# Patient Record
Sex: FEMALE | Race: WHITE | Employment: FULL TIME | URBAN - METROPOLITAN AREA
[De-identification: names, ages, dates, MRNs, and addresses within clinical notes are randomized per-mention and may not be internally consistent; named-entity substitution may affect disease eponyms.]

---

## 2018-01-20 ENCOUNTER — HOSPITAL ENCOUNTER (EMERGENCY)
Age: 58
Discharge: HOME | End: 2018-01-20
Attending: EMERGENCY MEDICINE
Payer: COMMERCIAL

## 2018-01-20 ENCOUNTER — APPOINTMENT (OUTPATIENT)
Dept: GENERAL RADIOLOGY | Age: 58
End: 2018-01-20
Payer: COMMERCIAL

## 2018-01-20 VITALS
OXYGEN SATURATION: 98 % | SYSTOLIC BLOOD PRESSURE: 120 MMHG | HEART RATE: 64 BPM | DIASTOLIC BLOOD PRESSURE: 74 MMHG | TEMPERATURE: 97.9 F

## 2018-01-20 DIAGNOSIS — M79.672 LEFT FOOT PAIN: ICD-10-CM

## 2018-01-20 DIAGNOSIS — M25.572 ACUTE LEFT ANKLE PAIN: Primary | ICD-10-CM

## 2018-01-20 DIAGNOSIS — M25.561 ACUTE PAIN OF RIGHT KNEE: ICD-10-CM

## 2018-01-20 PROCEDURE — 73590 X-RAY EXAM OF LOWER LEG: CPT

## 2018-01-20 PROCEDURE — 6370000000 HC RX 637 (ALT 250 FOR IP): Performed by: NURSE PRACTITIONER

## 2018-01-20 PROCEDURE — 73610 X-RAY EXAM OF ANKLE: CPT

## 2018-01-20 PROCEDURE — 99283 EMERGENCY DEPT VISIT LOW MDM: CPT

## 2018-01-20 PROCEDURE — 73562 X-RAY EXAM OF KNEE 3: CPT

## 2018-01-20 PROCEDURE — 73630 X-RAY EXAM OF FOOT: CPT

## 2018-01-20 RX ORDER — IBUPROFEN 800 MG/1
800 TABLET ORAL ONCE
Status: COMPLETED | OUTPATIENT
Start: 2018-01-20 | End: 2018-01-20

## 2018-01-20 RX ADMIN — IBUPROFEN 800 MG: 800 TABLET, FILM COATED ORAL at 15:19

## 2018-01-20 ASSESSMENT — PAIN SCALES - GENERAL
PAINLEVEL_OUTOF10: 6
PAINLEVEL_OUTOF10: 5

## 2018-01-20 NOTE — ED NOTES
Here today after a fall 2 weeks ago. States her left foot really became worse 4 days ago. \"I'm on my feet a lot, and the pain just got really bad 4 days ago. I just started a new job recently\". Denies paresthesia.      Lillie Trujillo RN  01/20/18 [id number]

## 2018-01-20 NOTE — ED PROVIDER NOTES
[address] [place] [address] ED  Emergency Department Encounter  Mid Level Provider     Pt Name: Mike Eagle  MRN: [id number]  [place] 1960  Date of evaluation: 1/20/18  PCP:  No primary care provider on file. CHIEF COMPLAINT       Chief Complaint   Patient presents with    Ankle Pain     lt ankle pain past week       HISTORY OF PRESENT ILLNESS  (Location/Symptom, Timing/Onset, Context/Setting, Quality, Duration, Modifying Factors, Severity.)      Mike Eagle is a 56 y.o. female who presents with Trip and fall over a boot while entering her home 2 weeks ago. Patient reports she injured her right knee and right lower leg. Patient also reports she injured her left ankle/left foot. Patient reports pain has been tolerable however 4 days ago the pain to the left ankle became severe. Patient reports she had a difficult time sleeping last night. 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 Patient is not accustomed to this. Patient does have a primary care provider on Tuesday but states that she can no longer tolerate the discomfort so came to the emergency room for evaluation. Patient was ambulatory to the room prior to my arrival.     PAST MEDICAL / SURGICAL / SOCIAL / FAMILY HISTORY      has a past medical history of Complete situs inversus with dextrocardia. has a past surgical history that includes knee surgery; Nasal septum surgery; Inner ear surgery; and Foot surgery. Social History     Social History    Marital status: Single     Spouse name: N/A    Number of children: N/A    Years of education: N/A     Occupational History    Not on file.      Social History Main Topics    Smoking status: Never Smoker    Smokeless tobacco: Not on file    Alcohol use Yes      Comment: social    Drug use: No    Sexual activity: Not on file     Other Topics Concern    Not on file     Social History Narrative    No narrative / Ritu Began / EKG):  Orders Placed This Encounter   Procedures    XR FOOT LEFT (MIN 3 VIEWS)    XR ANKLE LEFT (MIN 3 VIEWS)    XR KNEE RIGHT (3 VIEWS)    XR TIBIA FIBULA RIGHT (2 VIEWS)    Apply ace wrap       MEDICATIONS ORDERED:  Orders Placed This Encounter   Medications    ibuprofen (ADVIL;MOTRIN) tablet 800 mg       Controlled Substances Monitoring:      DIAGNOSTIC RESULTS / EMERGENCY DEPARTMENT COURSE / MDM     RADIOLOGY:   I directly visualized (with the attending physician) the following  images and reviewed the radiologist interpretations:  No results found. XR ANKLE LEFT (MIN 3 VIEWS)   Final Result   No acute bony or joint abnormality         XR FOOT LEFT (MIN 3 VIEWS)   Final Result   1. No acute bony or joint abnormality   2. Osteoarthritic changes throughout the foot         XR TIBIA FIBULA RIGHT (2 VIEWS)   Final Result   No acute osseous abnormality. XR KNEE RIGHT (3 VIEWS)   Final Result   1. No acute bony or joint abnormality   2. Tricompartmental osteoarthritic changes             LABS:  No results found for this visit on 01/20/18. EMERGENCY DEPARTMENT COURSE:  Patient advised of x-ray results. Advised her she can wear the Ace wrap for comfort. Work note provided as requested. Patient advised to maintain appointment with PCP on Tuesday as directed. Patient can return to emergency room for any worsening symptoms or concerns    CONSULTS:  None    PROCEDURES:  None    FINAL IMPRESSION      1. Acute left ankle pain    2. Acute pain of right knee    3. Left foot pain          DISPOSITION / PLAN     DISPOSITION Decision To Discharge    PATIENT REFERRED TO:  No follow-up provider specified. DISCHARGE MEDICATIONS:  There are no discharge medications for this patient.       Mehul Perrin CNP   Emergency Medicine Nurse Practitioner    (Please note that portions of this note were completed with a voice recognition program.  Efforts were made to edit the dictations but occasionally

## 2019-10-15 ENCOUNTER — TELEPHONE (OUTPATIENT)
Dept: BARIATRICS/WEIGHT MGMT | Age: 59
End: 2019-10-15

## 2019-10-30 ENCOUNTER — TELEPHONE (OUTPATIENT)
Dept: BARIATRICS/WEIGHT MGMT | Age: 59
End: 2019-10-30

## 2019-11-19 ENCOUNTER — OFFICE VISIT (OUTPATIENT)
Dept: BARIATRICS/WEIGHT MGMT | Age: 59
End: 2019-11-19
Payer: COMMERCIAL

## 2019-11-19 VITALS
DIASTOLIC BLOOD PRESSURE: 68 MMHG | WEIGHT: 293 LBS | BODY MASS INDEX: 47.09 KG/M2 | HEART RATE: 70 BPM | RESPIRATION RATE: 20 BRPM | SYSTOLIC BLOOD PRESSURE: 114 MMHG | HEIGHT: 66 IN

## 2019-11-19 DIAGNOSIS — Q89.3 SITUS INVERSUS ABDOMINALIS: Primary | ICD-10-CM

## 2019-11-19 PROCEDURE — 99204 OFFICE O/P NEW MOD 45 MIN: CPT | Performed by: SURGERY

## 2019-11-21 ENCOUNTER — TELEPHONE (OUTPATIENT)
Dept: BARIATRICS/WEIGHT MGMT | Age: 59
End: 2019-11-21

## 2019-11-21 ENCOUNTER — NURSE ONLY (OUTPATIENT)
Dept: BARIATRICS/WEIGHT MGMT | Age: 59
End: 2019-11-21

## 2019-11-21 VITALS — BODY MASS INDEX: 49.82 KG/M2 | WEIGHT: 293 LBS

## 2019-12-03 ENCOUNTER — HOSPITAL ENCOUNTER (OUTPATIENT)
Dept: CT IMAGING | Age: 59
Discharge: HOME | End: 2019-12-05
Payer: COMMERCIAL

## 2019-12-03 ENCOUNTER — HOSPITAL ENCOUNTER (OUTPATIENT)
Age: 59
Discharge: HOME | End: 2019-12-03
Payer: COMMERCIAL

## 2019-12-03 DIAGNOSIS — Q89.3 SITUS INVERSUS ABDOMINALIS: ICD-10-CM

## 2019-12-03 LAB
ALBUMIN SERPL-MCNC: 4.3 G/DL (ref 3.5–5.2)
ALBUMIN/GLOBULIN RATIO: ABNORMAL (ref 1–2.5)
ALP BLD-CCNC: 67 U/L (ref 35–104)
ALT SERPL-CCNC: 7 U/L (ref 5–33)
ANION GAP SERPL CALCULATED.3IONS-SCNC: 13 MMOL/L (ref 9–17)
AST SERPL-CCNC: 15 U/L
BILIRUB SERPL-MCNC: 0.3 MG/DL (ref 0.3–1.2)
BUN BLDV-MCNC: 14 MG/DL (ref 6–20)
BUN/CREAT BLD: 31 (ref 9–20)
CALCIUM SERPL-MCNC: 9.1 MG/DL (ref 8.6–10.4)
CHLORIDE BLD-SCNC: 101 MMOL/L (ref 98–107)
CO2: 24 MMOL/L (ref 20–31)
CREAT SERPL-MCNC: 0.45 MG/DL (ref 0.5–0.9)
FOLATE: 10.1 NG/ML
GFR AFRICAN AMERICAN: >60 ML/MIN
GFR NON-AFRICAN AMERICAN: >60 ML/MIN
GFR SERPL CREATININE-BSD FRML MDRD: ABNORMAL ML/MIN/{1.73_M2}
GFR SERPL CREATININE-BSD FRML MDRD: ABNORMAL ML/MIN/{1.73_M2}
GLUCOSE BLD-MCNC: 93 MG/DL (ref 70–99)
HCT VFR BLD CALC: 45.7 % (ref 36.3–47.1)
HEMOGLOBIN: 14.2 G/DL (ref 11.9–15.1)
IRON SATURATION: 22 % (ref 20–55)
IRON: 71 UG/DL (ref 37–145)
MAGNESIUM: 2 MG/DL (ref 1.6–2.6)
MCH RBC QN AUTO: 29.1 PG (ref 25.2–33.5)
MCHC RBC AUTO-ENTMCNC: 31.1 G/DL (ref 28.4–34.8)
MCV RBC AUTO: 93.6 FL (ref 82.6–102.9)
NRBC AUTOMATED: 0 PER 100 WBC
PDW BLD-RTO: 13 % (ref 11.8–14.4)
PLATELET # BLD: 306 K/UL (ref 138–453)
PMV BLD AUTO: 11.2 FL (ref 8.1–13.5)
POTASSIUM SERPL-SCNC: 4.3 MMOL/L (ref 3.7–5.3)
PTH INTACT: 101.8 PG/ML (ref 15–65)
RBC # BLD: 4.88 M/UL (ref 3.95–5.11)
SODIUM BLD-SCNC: 138 MMOL/L (ref 135–144)
T4 TOTAL: 9.2 UG/DL (ref 4.5–12)
TOTAL IRON BINDING CAPACITY: 320 UG/DL (ref 250–450)
TOTAL PROTEIN: 8 G/DL (ref 6.4–8.3)
TSH SERPL DL<=0.05 MIU/L-ACNC: 2.52 MIU/L (ref 0.3–5)
UNSATURATED IRON BINDING CAPACITY: 249 UG/DL (ref 112–347)
VITAMIN B-12: 330 PG/ML (ref 232–1245)
VITAMIN D 25-HYDROXY: 22 NG/ML (ref 30–100)
WBC # BLD: 10.6 K/UL (ref 3.5–11.3)

## 2019-12-03 PROCEDURE — 83735 ASSAY OF MAGNESIUM: CPT

## 2019-12-03 PROCEDURE — 82746 ASSAY OF FOLIC ACID SERUM: CPT

## 2019-12-03 PROCEDURE — 83970 ASSAY OF PARATHORMONE: CPT

## 2019-12-03 PROCEDURE — 36415 COLL VENOUS BLD VENIPUNCTURE: CPT

## 2019-12-03 PROCEDURE — 6360000004 HC RX CONTRAST MEDICATION: Performed by: SURGERY

## 2019-12-03 PROCEDURE — 74177 CT ABD & PELVIS W/CONTRAST: CPT

## 2019-12-03 PROCEDURE — 83540 ASSAY OF IRON: CPT

## 2019-12-03 PROCEDURE — 84590 ASSAY OF VITAMIN A: CPT

## 2019-12-03 PROCEDURE — 83550 IRON BINDING TEST: CPT

## 2019-12-03 PROCEDURE — 84630 ASSAY OF ZINC: CPT

## 2019-12-03 PROCEDURE — 82306 VITAMIN D 25 HYDROXY: CPT

## 2019-12-03 PROCEDURE — 82607 VITAMIN B-12: CPT

## 2019-12-03 PROCEDURE — 2580000003 HC RX 258: Performed by: SURGERY

## 2019-12-03 PROCEDURE — 84436 ASSAY OF TOTAL THYROXINE: CPT

## 2019-12-03 PROCEDURE — 83036 HEMOGLOBIN GLYCOSYLATED A1C: CPT

## 2019-12-03 PROCEDURE — 80053 COMPREHEN METABOLIC PANEL: CPT

## 2019-12-03 PROCEDURE — 84425 ASSAY OF VITAMIN B-1: CPT

## 2019-12-03 PROCEDURE — 85027 COMPLETE CBC AUTOMATED: CPT

## 2019-12-03 PROCEDURE — 84443 ASSAY THYROID STIM HORMONE: CPT

## 2019-12-03 RX ORDER — SODIUM CHLORIDE 0.9 % (FLUSH) 0.9 %
10 SYRINGE (ML) INJECTION PRN
Status: DISCONTINUED | OUTPATIENT
Start: 2019-12-03 | End: 2019-12-06 | Stop reason: HOSPADM

## 2019-12-03 RX ORDER — 0.9 % SODIUM CHLORIDE 0.9 %
80 INTRAVENOUS SOLUTION INTRAVENOUS ONCE
Status: COMPLETED | OUTPATIENT
Start: 2019-12-03 | End: 2019-12-03

## 2019-12-03 RX ADMIN — IOPAMIDOL 80 ML: 755 INJECTION, SOLUTION INTRAVENOUS at 15:08

## 2019-12-03 RX ADMIN — SODIUM CHLORIDE 80 ML: 9 INJECTION, SOLUTION INTRAVENOUS at 15:08

## 2019-12-03 RX ADMIN — Medication 10 ML: at 15:08

## 2019-12-03 RX ADMIN — IOHEXOL 30 ML: 300 INJECTION, SOLUTION INTRAVENOUS at 15:08

## 2019-12-03 NOTE — TELEPHONE ENCOUNTER
CT scan completed today 12-3-19, Dr. Eliseo Croft can you review the results and let me know if patient is going to be an appropriate candidate for surgery, she stated at her initial visit with you she was told once the CT results were reviewed it would be determined if she could proceed with bariatric surgery

## 2019-12-04 ENCOUNTER — OFFICE VISIT (OUTPATIENT)
Dept: BARIATRICS/WEIGHT MGMT | Age: 59
End: 2019-12-04
Payer: COMMERCIAL

## 2019-12-04 VITALS
BODY MASS INDEX: 47.09 KG/M2 | WEIGHT: 293 LBS | HEART RATE: 74 BPM | DIASTOLIC BLOOD PRESSURE: 74 MMHG | RESPIRATION RATE: 18 BRPM | SYSTOLIC BLOOD PRESSURE: 128 MMHG | HEIGHT: 66 IN

## 2019-12-04 DIAGNOSIS — Q89.3 SITUS INVERSUS TOTALIS: ICD-10-CM

## 2019-12-04 DIAGNOSIS — E66.01 OBESITY, CLASS III, BMI 40-49.9 (MORBID OBESITY) (HCC): ICD-10-CM

## 2019-12-04 DIAGNOSIS — Z86.39 HISTORY OF ELEVATED LIPIDS: ICD-10-CM

## 2019-12-04 DIAGNOSIS — Q89.3 KARTAGENER'S SYNDROME: ICD-10-CM

## 2019-12-04 DIAGNOSIS — R73.03 PREDIABETES: ICD-10-CM

## 2019-12-04 DIAGNOSIS — M19.90 ARTHRITIS: Primary | ICD-10-CM

## 2019-12-04 DIAGNOSIS — E55.9 VITAMIN D DEFICIENCY: ICD-10-CM

## 2019-12-04 PROBLEM — E78.5 HLD (HYPERLIPIDEMIA): Status: ACTIVE | Noted: 2019-12-04

## 2019-12-04 PROBLEM — E66.813 OBESITY, CLASS III, BMI 40-49.9 (MORBID OBESITY) (HCC): Status: ACTIVE | Noted: 2019-12-04

## 2019-12-04 LAB
ESTIMATED AVERAGE GLUCOSE: 126 MG/DL
HBA1C MFR BLD: 6 % (ref 4–6)

## 2019-12-04 PROCEDURE — 99213 OFFICE O/P EST LOW 20 MIN: CPT | Performed by: NURSE PRACTITIONER

## 2019-12-04 RX ORDER — ERGOCALCIFEROL 1.25 MG/1
50000 CAPSULE ORAL WEEKLY
Qty: 8 CAPSULE | Refills: 0 | Status: SHIPPED | OUTPATIENT
Start: 2019-12-04 | End: 2020-02-14 | Stop reason: ALTCHOICE

## 2019-12-05 LAB — ZINC: 77.4 UG/DL (ref 60–120)

## 2019-12-06 LAB
RETINYL PALMITATE: 0.03 MG/L (ref 0–0.1)
VITAMIN A LEVEL: 0.54 MG/L (ref 0.3–1.2)
VITAMIN A, INTERP: NORMAL
VITAMIN B1 WHOLE BLOOD: 123 NMOL/L (ref 70–180)

## 2020-01-03 ENCOUNTER — TELEPHONE (OUTPATIENT)
Dept: BARIATRICS/WEIGHT MGMT | Age: 60
End: 2020-01-03

## 2020-01-06 ENCOUNTER — HOSPITAL ENCOUNTER (OUTPATIENT)
Age: 60
Discharge: HOME | End: 2020-01-06
Payer: COMMERCIAL

## 2020-01-06 LAB
CHOLESTEROL/HDL RATIO: 3
CHOLESTEROL: 211 MG/DL
HDLC SERPL-MCNC: 71 MG/DL
LDL CHOLESTEROL: 115 MG/DL (ref 0–130)
TRIGL SERPL-MCNC: 127 MG/DL
VLDLC SERPL CALC-MCNC: ABNORMAL MG/DL (ref 1–30)

## 2020-01-06 PROCEDURE — 80061 LIPID PANEL: CPT

## 2020-01-15 ENCOUNTER — OFFICE VISIT (OUTPATIENT)
Dept: BARIATRICS/WEIGHT MGMT | Age: 60
End: 2020-01-15
Payer: COMMERCIAL

## 2020-01-15 VITALS
SYSTOLIC BLOOD PRESSURE: 108 MMHG | HEART RATE: 60 BPM | HEIGHT: 66 IN | DIASTOLIC BLOOD PRESSURE: 70 MMHG | BODY MASS INDEX: 47.09 KG/M2 | WEIGHT: 293 LBS

## 2020-01-15 PROCEDURE — 99213 OFFICE O/P EST LOW 20 MIN: CPT | Performed by: NURSE PRACTITIONER

## 2020-01-15 RX ORDER — TRAZODONE HYDROCHLORIDE 50 MG/1
50 TABLET ORAL
Status: ON HOLD | COMMUNITY
Start: 2020-01-15 | End: 2020-06-17 | Stop reason: ALTCHOICE

## 2020-01-15 NOTE — PROGRESS NOTES
Medical Weight Management Progress Note    Subjective     Patient being seen for medically supervised weight loss for the chronic conditions of Prediabetes, Kartagener's Syndrome, Situs Inversus Totalis, Arthritis, Hx Elevated Lipids. She is working on the behavior changes discussed at the initial appointment. Patient continues on diet plan. Physical activity includes walking. Weight loss since last visit is 4 lbs. Psych eval scheduled 2020. Needs to schedule EGD. Cardiac Clearance appt scheduled. No current issues. Working toward bariatric surgery:    [x] Sleeve Gastrectomy                                                           [] Kuldeep-en-Y Gastric Bypass    Allergies:  No Known Allergies    Past Medical History:     Past Medical History:   Diagnosis Date    Complete situs inversus with dextrocardia    . Past Surgical History:  Past Surgical History:   Procedure Laterality Date    FOOT SURGERY      rt foot (hardware remains)    INNER EAR SURGERY      rt / reconstruction    KNEE SURGERY      bilateral    NASAL SEPTUM SURGERY         Family History:  History reviewed. No pertinent family history.     Social History:  Social History     Socioeconomic History    Marital status: Single     Spouse name: Not on file    Number of children: Not on file    Years of education: Not on file    Highest education level: Not on file   Occupational History    Not on file   Social Needs    Financial resource strain: Not on file    Food insecurity:     Worry: Not on file     Inability: Not on file    Transportation needs:     Medical: Not on file     Non-medical: Not on file   Tobacco Use    Smoking status: Former Smoker     Last attempt to quit: 1993     Years since quittin.0    Smokeless tobacco: Never Used   Substance and Sexual Activity    Alcohol use: Yes     Comment: social    Drug use: No    Sexual activity: Not on file   Lifestyle    Physical activity:     Days per week:

## 2020-01-15 NOTE — PROGRESS NOTES
Medical Nutrition Therapy   Metabolic and Bariatric Surgery         Supervised diet and exercise preparation  Visit 2 out of 6  Pt reports:     Pt currently following structured meal plan see goal 23 for weight management. Reviewed with pt. Vitals: Wt Readings from Last 3 Encounters:   01/15/20 294 lb (133.4 kg)   12/04/19 298 lb (135.2 kg)   11/21/19 (!) 304 lb (137.9 kg)     lost 4 lbs over 1 month. Nutrition Assessment:   PES: Knowledge deficit related to healthy behaviors that support weight management post weight loss surgery as evidenced by Body mass index is 48.18 kg/m². Nutrition Assessment of Goal Attainment:  TREATMENT GOALS:    1. Pt  Completed 4 out of 4 goals. 2.TREATMENT GOALS FOR UPCOMING WEEK: continue all previous goals and add: # 15    All goals were planned with and agreed on by the patient. Goal Card  Name                                                                                                                           Stephanie Ibanez  1. I will read the entire patient educational binder provided to me prior to my second appointment at [facility]. 2. I will make my psychological evaluation appointment prior to my second appointment at [facility]. 3. I will bring this tracking tool to every appointment with a health care provider at [facility]. 4. I will eliminate all nicotine, tobacco and e-cigarettes prior to surgery. 5. I will limit alcoholic beverages prior to surgery to 1 mixed drink or glass of wine (4-6oz). 6. I will limit dining out including fast food to 3 times a week prior to surgery. 7. I will eliminate sugary beverages prior to surgery. 8. I will eliminate carbonated beverages prior to surgery. 9. I will eliminate drinking with a straw prior to surgery. 10. I will limit caffeinated beverages prior to my surgery to [address] daily. 11. I will eliminate cold cereals prepared with milk prior to surgery. 12. I will do a 5 minute reflection    13.  I will food journal daily (If I dont find this helpful after one month I may discontinue the behavior with the understanding that it will be important to my health to do this for the first three months following surgery). 14. I will exercise daily for 10-30  minutes daily 24 days per month or more as tolerated. I will keep a daily log of my physical activity each day. 15. I will determine my optimal supplement plan. 16. I will purchase my supplements. 17. I will begin taking supplements according to my plan. 18. I will eat 8-11 servings of lean protein daily following the guidelines for meal planning in the patient educational binder provided to me. 19. I will eat every 3-5 hours for all meals for one day each week on a day of my choosing. 20. I will maintain my fluid intake of at least 64oz daily. 21. I will follow the 15/30/15 rule at least one day each week for all meals I am allowed to have a small 4oz beverage as needed at meal times. ( 15-30-15-do not drink 15minutes prior to a meal, take 30minutes to eat your meal and dont drink 15 minutes after your meal)    22. I will eat around my plate at all meals at least one day each week on a day of my choosing. Please write down the greatest motivator that brought you to us today  I want to manage my weight because I want to be pain free                                appt # na oa What is your next step?   G# 1 2 3 4 5 6 7 8 9   0  x  1 100           0  x  2 100           2    3 100 100           x   4             x   5             x   6             x   7             x   8             x   9             x   10                11                12                13            2    14 100 100          2    15                16                17                18                19            1  x  20  100              21 this time patient is not moving forward  in developing the skills needed for success after surgery. Plan:    Continue to follow monthly and review goals.          [x]  Nutrition visits complete    []

## 2020-01-22 ENCOUNTER — NURSE ONLY (OUTPATIENT)
Dept: BARIATRICS/WEIGHT MGMT | Age: 60
End: 2020-01-22

## 2020-02-14 ENCOUNTER — OFFICE VISIT (OUTPATIENT)
Dept: BARIATRICS/WEIGHT MGMT | Age: 60
End: 2020-02-14
Payer: COMMERCIAL

## 2020-02-14 VITALS
BODY MASS INDEX: 46.77 KG/M2 | RESPIRATION RATE: 18 BRPM | HEIGHT: 66 IN | DIASTOLIC BLOOD PRESSURE: 82 MMHG | WEIGHT: 291 LBS | HEART RATE: 74 BPM | SYSTOLIC BLOOD PRESSURE: 122 MMHG

## 2020-02-14 PROCEDURE — 99213 OFFICE O/P EST LOW 20 MIN: CPT | Performed by: NURSE PRACTITIONER

## 2020-02-14 NOTE — PROGRESS NOTES
Medical Nutrition Therapy   Metabolic and Bariatric Surgery         Supervised diet and exercise preparation  Visit 3 out of 6  Pt reports:      Pt currently following structured meal plan 8pro/3veg/2fr/6 starch/3fat from education binder diet for weight management. Reviewed with pt. Vitals: Wt Readings from Last 3 Encounters:   02/14/20 291 lb (132 kg)   01/15/20 294 lb (133.4 kg)   12/04/19 298 lb (135.2 kg)     lost 3 lbs over 1 month. Nutrition Assessment:   PES: Knowledge deficit related to healthy behaviors that support weight management post weight loss surgery as evidenced by Body mass index is 47.67 kg/m². Nutrition Assessment of Goal Attainment:  TREATMENT GOALS:    1. Pt  Completed 4 out of 4 goals. 2.TREATMENT GOALS FOR UPCOMING WEEK: continue all previous goals and add: # 0    All goals were planned with and agreed on by the patient. Goal Card  Name                                                                                                                           Joaquin Bello  1. I will read the entire patient educational binder provided to me prior to my second appointment at [facility]. 2. I will make my psychological evaluation appointment prior to my second appointment at [facility]. 3. I will bring this tracking tool to every appointment with a health care provider at [facility]. 4. I will eliminate all nicotine, tobacco and e-cigarettes prior to surgery. 5. I will limit alcoholic beverages prior to surgery to 1 mixed drink or glass of wine (4-6oz). 6. I will limit dining out including fast food to 3 times a week prior to surgery. 7. I will eliminate sugary beverages prior to surgery. 8. I will eliminate carbonated beverages prior to surgery. 9. I will eliminate drinking with a straw prior to surgery. 10. I will limit caffeinated beverages prior to my surgery to [address] daily. 11. I will eliminate cold cereals prepared with milk prior to surgery.     12. I will do a 5 minute reflection    13. I will food journal daily (If I dont find this helpful after one month I may discontinue the behavior with the understanding that it will be important to my health to do this for the first three months following surgery). 14. I will exercise daily for 10-30  minutes daily 24 days per month or more as tolerated. I will keep a daily log of my physical activity each day. 15. I will determine my optimal supplement plan. 16. I will purchase my supplements. 17. I will begin taking supplements according to my plan. 18. I will eat 8-11 servings of lean protein daily following the guidelines for meal planning in the patient educational binder provided to me. 19. I will eat every 3-5 hours for all meals for one day each week on a day of my choosing. 20. I will maintain my fluid intake of at least 64oz daily. 21. I will follow the 15/30/15 rule at least one day each week for all meals I am allowed to have a small 4oz beverage as needed at meal times. ( 15-30-15-do not drink 15minutes prior to a meal, take 30minutes to eat your meal and dont drink 15 minutes after your meal)    22. I will eat around my plate at all meals at least one day each week on a day of my choosing. Please write down the greatest motivator that brought you to us today  I want to manage my weight because I want to be pain free                                appt # na oa What is your next step?   G# 1 2 3 4 5 6 7 8 9   0  x  1 100           0  x  2 100           3    3 100 100 100          x   4             x   5             x   6             x   7             x   8             x   9             x   10                11                12                13            3    14 100 100 100         2  x Option 1 15   100             16                17                18

## 2020-02-14 NOTE — PROGRESS NOTES
week: Not on file     Minutes per session: Not on file    Stress: Not on file   Relationships    Social connections:     Talks on phone: Not on file     Gets together: Not on file     Attends Latter-day service: Not on file     Active member of club or organization: Not on file     Attends meetings of clubs or organizations: Not on file     Relationship status: Not on file    Intimate partner violence:     Fear of current or ex partner: Not on file     Emotionally abused: Not on file     Physically abused: Not on file     Forced sexual activity: Not on file   Other Topics Concern    Not on file   Social History Narrative    Not on file       Current Medications:  Current Outpatient Medications   Medication Sig Dispense Refill    traZODone (DESYREL) 50 MG tablet Take 50 mg by mouth      Meloxicam (MOBIC PO) Take by mouth daily       No current facility-administered medications for this visit. Vital Signs:  /82 (Site: Right Upper Arm, Position: Sitting, Cuff Size: Large Adult)   Pulse 74   Resp 18   Ht 5' 5.51\" (1.664 m)   Wt 291 lb (132 kg)   BMI 47.67 kg/m²     BMI/Height/Weight:  Body mass index is 47.67 kg/m². Review of Systems - A review of systems was performed. All was negative unless otherwise documented in HPI. Constitutional: Negative for fever, chills and diaphoresis. HENT: Negative for hearing loss and trouble swallowing. Eyes: Negative for photophobia and visual disturbance. Respiratory: Negative for cough, shortness of breath and wheezing. Cardiovascular: Negative for chest pain and palpitations. Gastrointestinal: Negative for nausea, vomiting, abdominal pain, diarrhea, constipation, blood in stool and abdominal distention. Endocrine: Negative for polydipsia, polyphagia and polyuria. Genitourinary: Negative for dysuria, frequency, hematuria and difficulty urinating. Musculoskeletal: Negative for myalgias, joint swelling.    Skin: Negative for pallor and rash.   Neurological: Negative for dizziness, tremors, light-headedness and headaches. Psychiatric/Behavioral: Negative for sleep disturbance and dysphoric mood. Objective:      Physical Exam   Vital signs reviewed. General: Well-developed and well-nourished. No acute distress. Skin: Warm, dry and intact. HEENT: Normocephalic. EOMs intact. Conjunctivae normal. Neck supple. Cardiovascular: Normal rate, regular rhythm. Pulmonary/Chest: Normal effort. Lungs clear to auscultation. No rales, rhonchi or wheezing. Abdominal: Positive bowel sounds. Soft, nontender. Nondistended. Musculoskeletal: Movement x4. Bilateral lower extremity edema. Neurological: Gait normal. Alert and oriented to person, place, and time. Psychiatric: Normal mood and affect. Speech and behavior normal. Judgment and thought content normal. Cognition and memory intact. Assessment:       Diagnosis Orders   1. Prediabetes     2. Kartagener's syndrome     3. Situs inversus totalis     4. Arthritis     5. History of elevated lipids     6. Obesity, Class III, BMI 40-49.9 (morbid obesity) ([facility] [address] [address]         Plan:    Dietitian visit today. Patient was encouraged to journal all food intake. Keep calorie level at approximately 3675-3933. Protein intake is to be a minimum of 60-80 grams per day. Water drinking was encouraged with a goal of 64oz-128oz daily. Beverages to be calorie free except for milk. Every other beverage should be water. Avoid soda. Continue to increase level of physical activity. Encouraged use of exercise log. Follow-up  Return in about 1 month (around 3/14/2020). Orders this encounter:  No orders of the defined types were placed in this encounter. Prescriptions this encounter:  No orders of the defined types were placed in this encounter.       Electronically signed by:  Pretty Tobin CNP

## 2020-03-04 ENCOUNTER — OFFICE VISIT (OUTPATIENT)
Dept: BARIATRICS/WEIGHT MGMT | Age: 60
End: 2020-03-04
Payer: COMMERCIAL

## 2020-03-04 VITALS
RESPIRATION RATE: 18 BRPM | SYSTOLIC BLOOD PRESSURE: 124 MMHG | HEART RATE: 74 BPM | BODY MASS INDEX: 48.15 KG/M2 | WEIGHT: 289 LBS | HEIGHT: 65 IN | DIASTOLIC BLOOD PRESSURE: 74 MMHG

## 2020-03-04 PROCEDURE — 99213 OFFICE O/P EST LOW 20 MIN: CPT | Performed by: NURSE PRACTITIONER

## 2020-03-04 NOTE — PROGRESS NOTES
Medical Nutrition Therapy   Metabolic and Bariatric Surgery         Supervised diet and exercise preparation  Visit 4 out of 6  Pt reports:      Pt currently following structured meal see goal number 23 below diet for weight management. Reviewed with pt. Vitals: Wt Readings from Last 3 Encounters:   03/04/20 289 lb (131.1 kg)   02/14/20 291 lb (132 kg)   01/15/20 294 lb (133.4 kg)     lost 2 lbs over one month      Nutrition Assessment:   PES: Knowledge deficit related to healthy behaviors that support weight management post weight loss surgery as evidenced by Body mass index is 47.92 kg/m². Nutrition Assessment of Goal Attainment:  TREATMENT GOALS:    1. Pt  Completed 4 out of 4 goals. 2.TREATMENT GOALS FOR UPCOMING WEEK: continue all previous goals and add: # 0    All goals were planned with and agreed on by the patient. Goal Card  Name                                                                                                                           Cherelle Goodrich  1. I will read the entire patient educational binder provided to me prior to my second appointment at [facility]. 2. I will make my psychological evaluation appointment prior to my second appointment at [facility]. 3. I will bring this tracking tool to every appointment with a health care provider at [facility]. 4. I will eliminate all nicotine, tobacco and e-cigarettes prior to surgery. 5. I will limit alcoholic beverages prior to surgery to 1 mixed drink or glass of wine (4-6oz). 6. I will limit dining out including fast food to 3 times a week prior to surgery. 7. I will eliminate sugary beverages prior to surgery. 8. I will eliminate carbonated beverages prior to surgery. 9. I will eliminate drinking with a straw prior to surgery. 10. I will limit caffeinated beverages prior to my surgery to [address] daily. 11. I will eliminate cold cereals prepared with milk prior to surgery. 12. I will do a 5 minute reflection    13.  I 100              21                22            4   I will follow portion-controlled diet plan. 23  100 100             24                 25                                                                     Do you understand your goals? y    Do you have the information you need to achieve your goals? y    Do you have any questions  right now? n        [x]  Consistent goal achievement in the program thus far and further success with goals is expected. []  Unable to consistently make progress in goal achievement. At this time patient is not moving forward  in developing the skills needed for success after surgery. Plan:    Continue to follow monthly and review goals.          [x]  Nutrition visits complete    []

## 2020-03-04 NOTE — PROGRESS NOTES
Medical Weight Management Progress Note    Subjective     Patient being seen for medically supervised weight loss for the chronic conditions of Prediabetes, Kartagener's Syndrome, Situs Inversus Totalis, Arthritis, Hx Elevated Lipids. She is working on the behavior changes discussed at the initial appointment. Patient continues on diet plan. Physical activity includes walking. Weight loss since last visit is 2 lbs. Psych eval scheduled 3/17/20. EGD scheduled 3/18/20. Working on Cardiac Clearance. Had Echo done. No current issues. Working toward bariatric surgery:    [x] Sleeve Gastrectomy                                                           [] Kuldeep-en-Y Gastric Bypass    Allergies:  No Known Allergies    Past Medical History:     Past Medical History:   Diagnosis Date    Complete situs inversus with dextrocardia    . Past Surgical History:  Past Surgical History:   Procedure Laterality Date    FOOT SURGERY      rt foot (hardware remains)    INNER EAR SURGERY      rt / reconstruction    KNEE SURGERY      bilateral    NASAL SEPTUM SURGERY         Family History:  History reviewed. No pertinent family history.     Social History:  Social History     Socioeconomic History    Marital status: Single     Spouse name: Not on file    Number of children: Not on file    Years of education: Not on file    Highest education level: Not on file   Occupational History    Not on file   Social Needs    Financial resource strain: Not on file    Food insecurity:     Worry: Not on file     Inability: Not on file    Transportation needs:     Medical: Not on file     Non-medical: Not on file   Tobacco Use    Smoking status: Former Smoker     Last attempt to quit: 1993     Years since quittin.1    Smokeless tobacco: Never Used   Substance and Sexual Activity    Alcohol use: Yes     Comment: social    Drug use: No    Sexual activity: Not on file   Lifestyle    Physical activity:     Days per week: Not on file     Minutes per session: Not on file    Stress: Not on file   Relationships    Social connections:     Talks on phone: Not on file     Gets together: Not on file     Attends Pentecostalism service: Not on file     Active member of club or organization: Not on file     Attends meetings of clubs or organizations: Not on file     Relationship status: Not on file    Intimate partner violence:     Fear of current or ex partner: Not on file     Emotionally abused: Not on file     Physically abused: Not on file     Forced sexual activity: Not on file   Other Topics Concern    Not on file   Social History Narrative    Not on file       Current Medications:  Current Outpatient Medications   Medication Sig Dispense Refill    traZODone (DESYREL) 50 MG tablet Take 50 mg by mouth      Meloxicam (MOBIC PO) Take by mouth daily       No current facility-administered medications for this visit. Vital Signs:  /74 (Site: Right Upper Arm, Position: Sitting, Cuff Size: Large Adult)   Pulse 74   Resp 18   Ht 5' 5.12\" (1.654 m)   Wt 289 lb (131.1 kg)   BMI 47.92 kg/m²     BMI/Height/Weight:  Body mass index is 47.92 kg/m². Review of Systems - A review of systems was performed. All was negative unless otherwise documented in HPI. Constitutional: Negative for fever, chills and diaphoresis. HENT: Negative for hearing loss and trouble swallowing. Eyes: Negative for photophobia and visual disturbance. Respiratory: Negative for cough, shortness of breath and wheezing. Cardiovascular: Negative for chest pain and palpitations. Gastrointestinal: Negative for nausea, vomiting, abdominal pain, diarrhea, constipation, blood in stool and abdominal distention. Endocrine: Negative for polydipsia, polyphagia and polyuria. Genitourinary: Negative for dysuria, frequency, hematuria and difficulty urinating. Musculoskeletal: Negative for myalgias, joint swelling.    Skin: Negative for pallor and

## 2020-04-07 VITALS — BODY MASS INDEX: 48.48 KG/M2 | HEIGHT: 65 IN | WEIGHT: 291 LBS

## 2020-04-08 ENCOUNTER — TELEMEDICINE (OUTPATIENT)
Dept: BARIATRICS/WEIGHT MGMT | Age: 60
End: 2020-04-08
Payer: COMMERCIAL

## 2020-04-08 PROCEDURE — 99213 OFFICE O/P EST LOW 20 MIN: CPT | Performed by: NURSE PRACTITIONER

## 2020-05-20 ENCOUNTER — OFFICE VISIT (OUTPATIENT)
Dept: BARIATRICS/WEIGHT MGMT | Age: 60
End: 2020-05-20
Payer: COMMERCIAL

## 2020-05-20 VITALS
SYSTOLIC BLOOD PRESSURE: 118 MMHG | HEART RATE: 80 BPM | BODY MASS INDEX: 47.09 KG/M2 | TEMPERATURE: 96.6 F | HEIGHT: 66 IN | WEIGHT: 293 LBS | DIASTOLIC BLOOD PRESSURE: 68 MMHG

## 2020-05-20 PROCEDURE — 99213 OFFICE O/P EST LOW 20 MIN: CPT | Performed by: NURSE PRACTITIONER

## 2020-05-20 RX ORDER — AZITHROMYCIN 250 MG/1
TABLET, FILM COATED ORAL
Status: ON HOLD | COMMUNITY
Start: 2020-05-13 | End: 2020-06-17 | Stop reason: ALTCHOICE

## 2020-05-20 RX ORDER — FLUCONAZOLE 150 MG/1
TABLET ORAL
Status: ON HOLD | COMMUNITY
Start: 2020-05-13 | End: 2020-06-17 | Stop reason: ALTCHOICE

## 2020-05-20 RX ORDER — CYCLOBENZAPRINE HCL 10 MG
10 TABLET ORAL EVERY 8 HOURS PRN
COMMUNITY
Start: 2020-03-10 | End: 2020-08-20 | Stop reason: ALTCHOICE

## 2020-05-20 NOTE — PROGRESS NOTES
surgery. 12. I will do a 5 minute reflection    13. I will food journal daily (If I dont find this helpful after one month I may discontinue the behavior with the understanding that it will be important to my health to do this for the first three months following surgery). 14. I will exercise daily for 10-30  minutes daily 24 days per month or more as tolerated. I will keep a daily log of my physical activity each day. 15. I will determine my optimal supplement plan. 16. I will purchase my supplements. 17. I will begin taking supplements according to my plan. 18. I will eat 8-11 servings of lean protein daily following the guidelines for meal planning in the patient educational binder provided to me. 19. I will eat every 3-5 hours for all meals for one day each week on a day of my choosing. 20. I will maintain my fluid intake of at least 64oz daily. 21. I will follow the 15/30/15 rule at least one day each week for all meals I am allowed to have a small 4oz beverage as needed at meal times. ( 15-30-15-do not drink 15minutes prior to a meal, take 30minutes to eat your meal and dont drink 15 minutes after your meal)    22. I will eat around my plate at all meals at least one day each week on a day of my choosing. Please write down the greatest motivator that brought you to us today  I want to manage my weight because I want to be pain free                                appt # na oa What is your next step?   G# 1 2 3 4 5 6 7 8 9   0  x  1 100           0  x  2 100           5    3 100 100 100 100 100 100       x   4             x   5             x   6             x   7             x   8             x   9             x   10             x   11                12                13            5    14 100 100 100 100 100 100      2  x Option 1 15   100             16

## 2020-05-20 NOTE — PROGRESS NOTES
week: Not on file     Minutes per session: Not on file    Stress: Not on file   Relationships    Social connections     Talks on phone: Not on file     Gets together: Not on file     Attends Quaker service: Not on file     Active member of club or organization: Not on file     Attends meetings of clubs or organizations: Not on file     Relationship status: Not on file    Intimate partner violence     Fear of current or ex partner: Not on file     Emotionally abused: Not on file     Physically abused: Not on file     Forced sexual activity: Not on file   Other Topics Concern    Not on file   Social History Narrative    Not on file       Current Medications:  Current Outpatient Medications   Medication Sig Dispense Refill    azithromycin (ZITHROMAX) 250 MG tablet Take 2 tablets the first day, then 1 tablet daily for 4 days.  cyclobenzaprine (FLEXERIL) 10 MG tablet Take 10 mg by mouth every 8 hours as needed      fluconazole (DIFLUCAN) 150 MG tablet take 1 tablet by mouth AS A ONE TIME DOSE      traZODone (DESYREL) 50 MG tablet Take 50 mg by mouth      Meloxicam (MOBIC PO) Take by mouth daily       No current facility-administered medications for this visit. Vital Signs:  /68   Pulse 80   Temp 96.6 °F (35.9 °C)   Ht 5' 5.5\" (1.664 m)   Wt 300 lb (136.1 kg)   BMI 49.16 kg/m²     BMI/Height/Weight:  Body mass index is 49.16 kg/m². Review of Systems - A review of systems was performed. All was negative unless otherwise documented in HPI. Constitutional: Negative for fever, chills and diaphoresis. HENT: Negative for hearing loss and trouble swallowing. Eyes: Negative for photophobia and visual disturbance. Respiratory: Negative for cough, shortness of breath and wheezing. Cardiovascular: Negative for chest pain and palpitations. Gastrointestinal: Negative for nausea, vomiting, abdominal pain, diarrhea, constipation, blood in stool and abdominal distention.    Endocrine: types were placed in this encounter.       Electronically signed by:  Johnson Jessica CNP

## 2020-06-01 ENCOUNTER — TELEPHONE (OUTPATIENT)
Dept: BARIATRICS/WEIGHT MGMT | Age: 60
End: 2020-06-01

## 2020-06-08 NOTE — TELEPHONE ENCOUNTER
Spoke with patient - she is aware of the switch of physicians     LVM for patient to call office regarding EGD & COVID scheduling.       Scheduled for EGD on 6/17/20 @ 1:00 pm ([facility])  COVID testing 6/13/20 @ [facility]

## 2020-06-13 ENCOUNTER — HOSPITAL ENCOUNTER (OUTPATIENT)
Dept: PREADMISSION TESTING | Age: 60
Setting detail: SPECIMEN
Discharge: HOME | End: 2020-06-17
Payer: COMMERCIAL

## 2020-06-13 PROCEDURE — U0004 COV-19 TEST NON-CDC HGH THRU: HCPCS

## 2020-06-14 LAB
SARS-COV-2, PCR: NOT DETECTED
SARS-COV-2, RAPID: NORMAL
SARS-COV-2: NORMAL
SOURCE: NORMAL

## 2020-06-15 ENCOUNTER — TELEPHONE (OUTPATIENT)
Dept: PRIMARY CARE CLINIC | Age: 60
End: 2020-06-15

## 2020-06-17 ENCOUNTER — ANESTHESIA EVENT (OUTPATIENT)
Dept: OPERATING ROOM | Age: 60
End: 2020-06-17
Payer: COMMERCIAL

## 2020-06-17 ENCOUNTER — ANESTHESIA (OUTPATIENT)
Dept: OPERATING ROOM | Age: 60
End: 2020-06-17
Payer: COMMERCIAL

## 2020-06-17 ENCOUNTER — HOSPITAL ENCOUNTER (OUTPATIENT)
Age: 60
Setting detail: OUTPATIENT SURGERY
Discharge: HOME | End: 2020-06-17
Attending: SURGERY | Admitting: SURGERY
Payer: COMMERCIAL

## 2020-06-17 VITALS
OXYGEN SATURATION: 99 % | WEIGHT: 293 LBS | SYSTOLIC BLOOD PRESSURE: 132 MMHG | RESPIRATION RATE: 17 BRPM | BODY MASS INDEX: 47.09 KG/M2 | DIASTOLIC BLOOD PRESSURE: 72 MMHG | HEIGHT: 66 IN | TEMPERATURE: 97.3 F | HEART RATE: 61 BPM

## 2020-06-17 VITALS — OXYGEN SATURATION: 99 % | SYSTOLIC BLOOD PRESSURE: 114 MMHG | DIASTOLIC BLOOD PRESSURE: 83 MMHG

## 2020-06-17 PROCEDURE — 6360000002 HC RX W HCPCS: Performed by: NURSE ANESTHETIST, CERTIFIED REGISTERED

## 2020-06-17 PROCEDURE — 7100000040 HC SPAR PHASE II RECOVERY - FIRST 15 MIN: Performed by: SURGERY

## 2020-06-17 PROCEDURE — 2580000003 HC RX 258: Performed by: ANESTHESIOLOGY

## 2020-06-17 PROCEDURE — 3700000000 HC ANESTHESIA ATTENDED CARE: Performed by: SURGERY

## 2020-06-17 PROCEDURE — 43239 EGD BIOPSY SINGLE/MULTIPLE: CPT | Performed by: SURGERY

## 2020-06-17 PROCEDURE — 2500000003 HC RX 250 WO HCPCS: Performed by: NURSE ANESTHETIST, CERTIFIED REGISTERED

## 2020-06-17 PROCEDURE — 3700000001 HC ADD 15 MINUTES (ANESTHESIA): Performed by: SURGERY

## 2020-06-17 PROCEDURE — 88305 TISSUE EXAM BY PATHOLOGIST: CPT

## 2020-06-17 PROCEDURE — 7100000041 HC SPAR PHASE II RECOVERY - ADDTL 15 MIN: Performed by: SURGERY

## 2020-06-17 PROCEDURE — 3609012400 HC EGD TRANSORAL BIOPSY SINGLE/MULTIPLE: Performed by: SURGERY

## 2020-06-17 PROCEDURE — 2709999900 HC NON-CHARGEABLE SUPPLY: Performed by: SURGERY

## 2020-06-17 RX ORDER — ACETAMINOPHEN 500 MG
1000 TABLET ORAL EVERY 6 HOURS PRN
Status: ON HOLD | COMMUNITY
End: 2020-09-09 | Stop reason: HOSPADM

## 2020-06-17 RX ORDER — SODIUM CHLORIDE, SODIUM LACTATE, POTASSIUM CHLORIDE, CALCIUM CHLORIDE 600; 310; 30; 20 MG/100ML; MG/100ML; MG/100ML; MG/100ML
INJECTION, SOLUTION INTRAVENOUS CONTINUOUS
Status: DISCONTINUED | OUTPATIENT
Start: 2020-06-17 | End: 2020-06-17 | Stop reason: HOSPADM

## 2020-06-17 RX ORDER — PROPOFOL 10 MG/ML
INJECTION, EMULSION INTRAVENOUS CONTINUOUS PRN
Status: DISCONTINUED | OUTPATIENT
Start: 2020-06-17 | End: 2020-06-17 | Stop reason: SDUPTHER

## 2020-06-17 RX ORDER — LIDOCAINE HYDROCHLORIDE 10 MG/ML
INJECTION, SOLUTION EPIDURAL; INFILTRATION; INTRACAUDAL; PERINEURAL PRN
Status: DISCONTINUED | OUTPATIENT
Start: 2020-06-17 | End: 2020-06-17 | Stop reason: SDUPTHER

## 2020-06-17 RX ADMIN — SODIUM CHLORIDE, POTASSIUM CHLORIDE, SODIUM LACTATE AND CALCIUM CHLORIDE: 600; 310; 30; 20 INJECTION, SOLUTION INTRAVENOUS at 12:36

## 2020-06-17 RX ADMIN — LIDOCAINE HYDROCHLORIDE 50 MG: 10 INJECTION, SOLUTION EPIDURAL; INFILTRATION; INTRACAUDAL; PERINEURAL at 13:46

## 2020-06-17 RX ADMIN — PROPOFOL 250 MCG/KG/MIN: 10 INJECTION, EMULSION INTRAVENOUS at 13:46

## 2020-06-17 RX ADMIN — SODIUM CHLORIDE, POTASSIUM CHLORIDE, SODIUM LACTATE AND CALCIUM CHLORIDE: 600; 310; 30; 20 INJECTION, SOLUTION INTRAVENOUS at 13:42

## 2020-06-17 ASSESSMENT — PULMONARY FUNCTION TESTS
PIF_VALUE: 1
PIF_VALUE: 0
PIF_VALUE: 0
PIF_VALUE: 1
PIF_VALUE: 0
PIF_VALUE: 1
PIF_VALUE: 0
PIF_VALUE: 1
PIF_VALUE: 0
PIF_VALUE: 1
PIF_VALUE: 0
PIF_VALUE: 1

## 2020-06-17 ASSESSMENT — PAIN SCALES - GENERAL
PAINLEVEL_OUTOF10: 0

## 2020-06-17 ASSESSMENT — PAIN - FUNCTIONAL ASSESSMENT: PAIN_FUNCTIONAL_ASSESSMENT: 0-10

## 2020-06-17 NOTE — H&P
History and Physical    Pt Name: Anusha Agosto  MRN: [id number]  YOB: 1960  Date of evaluation: 2020  Primary Care Physician: Nadine Mitchell    SUBJECTIVE:   History of Chief Complaint:    Anusha Agosto is a 64 y.o. female who is scheduled today for EGD. She follows with [facility] weight management team. She denies any GI complaints. She has Kartagener syndrome, complete situs inversus with dextrocardia. Allergies  has No Known Allergies. Medications  Prior to Admission medications    Medication Sig Start Date End Date Taking? Authorizing Provider   acetaminophen (TYLENOL) 500 MG tablet Take 1,000 mg by mouth every 6 hours as needed for Pain   Yes Historical Provider, MD   cyclobenzaprine (FLEXERIL) 10 MG tablet Take 10 mg by mouth every 8 hours as needed 3/10/20   Historical Provider, MD   Meloxicam (MOBIC PO) Take by mouth daily    Historical Provider, MD     Past Medical History    has a past medical history of Chronic nasal congestion, Complete situs inversus with dextrocardia, Hearing loss, HLD (hyperlipidemia), Kartagener syndrome, Obesity, and Osteoarthritis. Past Surgical History   has a past surgical history that includes knee surgery; Nasal septum surgery; Inner ear surgery; and Foot surgery. Social History   reports that she quit smoking about 27 years ago. She has never used smokeless tobacco.   reports current alcohol use. reports no history of drug use. Marital Status single  Children none  Occupation [address]  History  Family Status   Relation Name Status    Mother  (Not Specified)   Magdalene Avila MGM       family history includes Cancer in her mother; Diabetes in her maternal grandmother. OBJECTIVE:   VITALS:  height is 5' 5.5\" (1.664 m) and weight is 308 lb 8 oz (139.9 kg) (abnormal). Her temporal temperature is 98.8 °F (37.1 °C). Her blood pressure is 134/63 and her pulse is 73. Her respiration is 20 and oxygen saturation is 96%.    CONSTITUTIONAL:Alert and

## 2020-06-17 NOTE — ANESTHESIA PRE PROCEDURE
status: Former Smoker     Last attempt to quit: 1993     Years since quittin.4    Smokeless tobacco: Never Used   Substance Use Topics    Alcohol use: Yes     Comment: social                                Counseling given: Not Answered      Vital Signs (Current):   Vitals:    20 1219 20 1240   BP:  134/63   Pulse:  73   Resp:  20   Temp:  98.8 °F (37.1 °C)   TempSrc:  Temporal   SpO2:  96%   Weight: (!) 308 lb 8 oz (139.9 kg)    Height: 5' 5.5\" (1.664 m)                                               BP Readings from Last 3 Encounters:   20 134/63   20 118/68   20 124/74       NPO Status: Time of last liquid consumption:                         Time of last solid consumption:                         Date of last liquid consumption: 20                        Date of last solid food consumption: 20    BMI:   Wt Readings from Last 3 Encounters:   20 (!) 308 lb 8 oz (139.9 kg)   20 300 lb (136.1 kg)   20 291 lb (132 kg)     Body mass index is 50.56 kg/m². CBC:   Lab Results   Component Value Date    WBC 10.6 2019    RBC 4.88 2019    HGB 14.2 2019    HCT 45.7 2019    MCV 93.6 2019    RDW 13.0 2019     2019       CMP:   Lab Results   Component Value Date     2019    K 4.3 2019     2019    CO2 24 2019    BUN 14 2019    CREATININE 0.45 2019    GFRAA >60 2019    LABGLOM >60 2019    GLUCOSE 93 2019    PROT 8.0 2019    CALCIUM 9.1 2019    BILITOT 0.30 2019    ALKPHOS 67 2019    AST 15 2019    ALT 7 2019       POC Tests: No results for input(s): POCGLU, POCNA, POCK, POCCL, POCBUN, POCHEMO, POCHCT in the last 72 hours.     Coags: No results found for: PROTIME, INR, APTT    HCG (If Applicable): No results found for: PREGTESTUR, PREGSERUM, HCG, HCGQUANT     ABGs: No results found for: PHART, PO2ART,

## 2020-06-18 LAB — SURGICAL PATHOLOGY REPORT: NORMAL

## 2020-06-19 ENCOUNTER — TELEPHONE (OUTPATIENT)
Dept: BARIATRICS/WEIGHT MGMT | Age: 60
End: 2020-06-19

## 2020-06-26 ENCOUNTER — OFFICE VISIT (OUTPATIENT)
Dept: BARIATRICS/WEIGHT MGMT | Age: 60
End: 2020-06-26
Payer: COMMERCIAL

## 2020-06-26 VITALS
DIASTOLIC BLOOD PRESSURE: 84 MMHG | RESPIRATION RATE: 18 BRPM | SYSTOLIC BLOOD PRESSURE: 118 MMHG | WEIGHT: 293 LBS | TEMPERATURE: 96.6 F | BODY MASS INDEX: 47.09 KG/M2 | HEART RATE: 68 BPM | HEIGHT: 66 IN

## 2020-06-26 PROCEDURE — 99213 OFFICE O/P EST LOW 20 MIN: CPT | Performed by: SURGERY

## 2020-07-05 NOTE — PROGRESS NOTES
[facility]  [facility]  [address]  [address]  [facility] [address]  Dept: [phone]    SURGICAL WEIGHT MANAGEMENT PROGRAM   PROGRESS NOTE - SURGICAL EVALUATION    CC: Weight Loss     Patient: Shabnam Case        Service Date: 6/26/2020       Medical Record #: [id number]    Patient History/Assessment Summary:    The patient is a pleasant 64y.o. year old female  with morbid obesity, who stands Height: 5' 5.5\" (166.4 cm) tall with a weight of Weight: 300 lb (136.1 kg) , resulting in a BMI of Body mass index is 49.16 kg/m². .     This patient is alone for the evaluation today. The patient is being evaluated to undergo weight loss surgery to treat the following comorbid conditions caused by her morbid obesity: Hyperlipidemia and Degenerative Joint Disease (DJD)    She attended the weight loss surgery seminar, and attended bariatric education class. Last Visit Weight:   Wt Readings from Last 3 Encounters:   06/26/20 300 lb (136.1 kg)   06/17/20 (!) 308 lb 8 oz (139.9 kg)   05/20/20 300 lb (136.1 kg)     Today's weight is decreased from the last visit. Highest Weight: 308    The patient is being evaluated for Laparoscopic Sleeve Gastrectomy. She  is here today to review the details of surgery    The patient acknowledges and understands the risks, benefits, and options we have discussed, as outlined in the Additional Informed Consent for this procedure. Patient also understands the importance of surgical and post-operative recommendations, including the operative diet and regular post-operative follow up care. The importance of ambulation and incentive spirometry was also discussed. All questions of this patient and any family members present have been answered to their satisfaction.     Physical Examination:     /84 (Site: Right Upper Arm, Position: Sitting, Cuff Size: Large Adult)   Pulse 68   Temp 96.6 °F (35.9 °C) (Infrared)   Resp 18   Ht 5' 5.5\" (1.664 m)   Wt

## 2020-08-06 ENCOUNTER — TELEPHONE (OUTPATIENT)
Dept: BARIATRICS/WEIGHT MGMT | Age: 60
End: 2020-08-06

## 2020-08-06 NOTE — TELEPHONE ENCOUNTER
Patient would like to know if her covid testing could be done earlier 9/4/2020 to an earlier time than 0, advised patient, we will call scheduling and notify her , advised will call her tomorrow 8/6/2020

## 2020-08-07 NOTE — TELEPHONE ENCOUNTER
lvm for patient that COVID testing has been changed from 2:30 pm to 8:50 am at [address]  Detailed voice message left for patient.

## 2020-08-10 NOTE — TELEPHONE ENCOUNTER
Left another message for patient regarding COVID testing to be done at 8:50 am on 9/4/20 at [address]

## 2020-08-20 ENCOUNTER — OFFICE VISIT (OUTPATIENT)
Dept: BARIATRICS/WEIGHT MGMT | Age: 60
End: 2020-08-20
Payer: COMMERCIAL

## 2020-08-20 VITALS
BODY MASS INDEX: 47.09 KG/M2 | TEMPERATURE: 96.9 F | DIASTOLIC BLOOD PRESSURE: 70 MMHG | HEIGHT: 66 IN | RESPIRATION RATE: 20 BRPM | SYSTOLIC BLOOD PRESSURE: 127 MMHG | HEART RATE: 88 BPM | WEIGHT: 293 LBS

## 2020-08-20 PROCEDURE — 99213 OFFICE O/P EST LOW 20 MIN: CPT | Performed by: SURGERY

## 2020-08-20 RX ORDER — GABAPENTIN 300 MG/1
300 CAPSULE ORAL DAILY
Qty: 2 CAPSULE | Refills: 0 | Status: ON HOLD | OUTPATIENT
Start: 2020-08-20 | End: 2020-09-09 | Stop reason: HOSPADM

## 2020-08-20 NOTE — PATIENT INSTRUCTIONS
Enchanced Recovery After Surgery ( ERAS):    The aim of the protocol is to improve patient outcomes after surgery- specifically regarding dehydration, postoperative pain, and preservation of nutrition and metabolic function. Instructions:    · Drink 8oz of G2 Gatorade and take dose of gabapentin ( Neurontin) at 8PM the night before surgery  · Drink 8oz of G2 Gatorade and take dose of gabapentin ( Neurontin) 2 hours prior to scheduled surgery time.

## 2020-08-24 ENCOUNTER — NURSE ONLY (OUTPATIENT)
Dept: BARIATRICS/WEIGHT MGMT | Age: 60
End: 2020-08-24

## 2020-08-24 ENCOUNTER — HOSPITAL ENCOUNTER (OUTPATIENT)
Dept: GENERAL RADIOLOGY | Age: 60
Discharge: HOME | End: 2020-08-26
Payer: COMMERCIAL

## 2020-08-24 ENCOUNTER — HOSPITAL ENCOUNTER (OUTPATIENT)
Dept: PREADMISSION TESTING | Age: 60
Discharge: HOME | End: 2020-08-28
Payer: COMMERCIAL

## 2020-08-24 VITALS
TEMPERATURE: 97.2 F | HEART RATE: 55 BPM | DIASTOLIC BLOOD PRESSURE: 85 MMHG | SYSTOLIC BLOOD PRESSURE: 151 MMHG | HEIGHT: 66 IN | WEIGHT: 293 LBS | RESPIRATION RATE: 22 BRPM | BODY MASS INDEX: 47.09 KG/M2 | OXYGEN SATURATION: 97 %

## 2020-08-24 VITALS — TEMPERATURE: 96.8 F

## 2020-08-24 LAB
ANION GAP SERPL CALCULATED.3IONS-SCNC: 10 MMOL/L (ref 9–17)
BUN BLDV-MCNC: 12 MG/DL (ref 6–20)
BUN/CREAT BLD: ABNORMAL (ref 9–20)
CALCIUM SERPL-MCNC: 8.9 MG/DL (ref 8.6–10.4)
CHLORIDE BLD-SCNC: 106 MMOL/L (ref 98–107)
CO2: 27 MMOL/L (ref 20–31)
CREAT SERPL-MCNC: 0.46 MG/DL (ref 0.5–0.9)
GFR AFRICAN AMERICAN: >60 ML/MIN
GFR NON-AFRICAN AMERICAN: >60 ML/MIN
GFR SERPL CREATININE-BSD FRML MDRD: ABNORMAL ML/MIN/{1.73_M2}
GFR SERPL CREATININE-BSD FRML MDRD: ABNORMAL ML/MIN/{1.73_M2}
GLUCOSE BLD-MCNC: 100 MG/DL (ref 70–99)
HCT VFR BLD CALC: 42.8 % (ref 36.3–47.1)
HEMOGLOBIN: 13.1 G/DL (ref 11.9–15.1)
INR BLD: 0.9
MCH RBC QN AUTO: 28.7 PG (ref 25.2–33.5)
MCHC RBC AUTO-ENTMCNC: 30.6 G/DL (ref 28.4–34.8)
MCV RBC AUTO: 93.7 FL (ref 82.6–102.9)
NRBC AUTOMATED: 0 PER 100 WBC
PARTIAL THROMBOPLASTIN TIME: 26.2 SEC (ref 20.5–30.5)
PDW BLD-RTO: 12.8 % (ref 11.8–14.4)
PLATELET # BLD: 302 K/UL (ref 138–453)
PMV BLD AUTO: 10.1 FL (ref 8.1–13.5)
POTASSIUM SERPL-SCNC: 4.6 MMOL/L (ref 3.7–5.3)
PROTHROMBIN TIME: 9.3 SEC (ref 9–12)
RBC # BLD: 4.57 M/UL (ref 3.95–5.11)
SODIUM BLD-SCNC: 143 MMOL/L (ref 135–144)
WBC # BLD: 6.8 K/UL (ref 3.5–11.3)

## 2020-08-24 PROCEDURE — 71046 X-RAY EXAM CHEST 2 VIEWS: CPT

## 2020-08-24 PROCEDURE — 85027 COMPLETE CBC AUTOMATED: CPT

## 2020-08-24 PROCEDURE — 85610 PROTHROMBIN TIME: CPT

## 2020-08-24 PROCEDURE — G0480 DRUG TEST DEF 1-7 CLASSES: HCPCS

## 2020-08-24 PROCEDURE — 80048 BASIC METABOLIC PNL TOTAL CA: CPT

## 2020-08-24 PROCEDURE — 93005 ELECTROCARDIOGRAM TRACING: CPT | Performed by: SURGERY

## 2020-08-24 PROCEDURE — 36415 COLL VENOUS BLD VENIPUNCTURE: CPT

## 2020-08-24 PROCEDURE — 85730 THROMBOPLASTIN TIME PARTIAL: CPT

## 2020-08-24 RX ORDER — SODIUM CHLORIDE, SODIUM LACTATE, POTASSIUM CHLORIDE, CALCIUM CHLORIDE 600; 310; 30; 20 MG/100ML; MG/100ML; MG/100ML; MG/100ML
1000 INJECTION, SOLUTION INTRAVENOUS CONTINUOUS
Status: CANCELLED | OUTPATIENT
Start: 2020-08-24

## 2020-08-24 RX ORDER — HEPARIN SODIUM 5000 [USP'U]/ML
5000 INJECTION, SOLUTION INTRAVENOUS; SUBCUTANEOUS ONCE
Status: CANCELLED | OUTPATIENT
Start: 2020-08-24 | End: 2020-08-24

## 2020-08-24 NOTE — PROGRESS NOTES
Anesthesia Focused Assessment    STOP-BANG Sleep Apnea Questionnaire    SNORE loudly (heard through closed doors)? Yes  TIRED, fatigued, sleepy during daytime? No  OBSERVED stopping breathing during sleep? No  High blood PRESSURE being treated? No    BMI over 35? Yes  AGE over 48? Yes  NECK circumference over 16\"? Yes  GENDER (male)? No             Total 4  High risk 5-8  Intermediate risk 3-4  Low risk 0-2    Obstructive Sleep Apnea: snores but denies apnea  If YES, machine used: no     Type 1 DM:   no  T2DM:  no    Coronary Artery Disease:  no  Hypertension:  no    Active smoker:  Quit 1993. Drinks Alcohol:  socially    Dentition: molar crowns x 2    Defib / AICD / Pacemaker: no      Renal Failure/dialysis:  no    Patient was evaluated in PAT & anesthesia guidelines were applied. NPO guidelines, medication instructions and scheduled arrival time were reviewed with patient. Hx of anesthesia complications:  no  Family hx of anesthesia complications:  no                                                                                                                     Anesthesia contacted:   Yes, regarding bronchiectasis. Medical or cardiac clearance ordered: medical clearance in chart. Cardiac clearance in chart, will fax EKG to Cardiology for updated clearance as EKG has changes. I spoke with Dr. Ryley Bernard regarding bronchiectasis, does not see pulmonology. Patient has chronic cough and congestion, inhalers have not been helpful in the past.  No pulmonary clearance ordered, Dr. Ryley Bernard states will evaluate day of surgery regarding her bronchiectasis. PCP clearance in chart. CXR completed today.     Spencer Salinas PA-C  08/24/20  1:55 PM      JODEE CHAPARRO PA-C  8/24/20  12:05 PM

## 2020-08-24 NOTE — H&P
single  Occupation works for [employer]    OBJECTIVE:   VITALS:  height is 5' 6\" (1.676 m) and weight is 309 lb 1.9 oz (140.2 kg) (abnormal). Her temporal temperature is 97.2 °F (36.2 °C). Her blood pressure is 151/85 (abnormal) and her pulse is 55. Her respiration is 22 and oxygen saturation is 97%. CONSTITUTIONAL:alert & oriented x 3, no acute distress. Very pleasant. Cough noted and sinus congestion. SKIN:  Warm and dry, no rashes on exposed areas of skin. HEAD:  Normocephalic, atraumatic. EYES: PERRL. EOMs intact. Wearing glasses. EARS:  Hearing loss mild. NOSE:  Nares patent. No rhinorrhea. MOUTH/THROAT:  benign  NECK:supple, no lymphadenopathy. Thick neck. LUNGS: Cough noted. Scattered rhonchi that clear with cough. Overall decreased breath sounds. CARDIOVASCULAR: Heart sounds are normal.  Regular rate and rhythm without murmur. ABDOMEN: soft, non tender, non distended. Obese. EXTREMITIES: no edema bilateral lower extremities. Testing:   EK20  Labs pending: drawn 2020  Chest XRay:  20    IMPRESSIONS:   1. obesity  2.  has a past medical history of Bronchiectasis ([address] Chronic cough, Chronic nasal congestion, Complete situs inversus with dextrocardia, Dextrocardia, Hearing loss, HLD (hyperlipidemia), Kartagener syndrome (age 24), Obesity, Osteoarthritis, Snores, Wears prescription eyeglasses, and Wellness examination. PLANS:   1.  Sleeve gastrectomy    JODEE Santiago PA-C  Electronically signed 2020 at 12:05 PM

## 2020-08-25 LAB
EKG ATRIAL RATE: 57 BPM
EKG P AXIS: 3 DEGREES
EKG P-R INTERVAL: 142 MS
EKG Q-T INTERVAL: 432 MS
EKG QRS DURATION: 86 MS
EKG QTC CALCULATION (BAZETT): 420 MS
EKG R AXIS: -18 DEGREES
EKG T AXIS: 90 DEGREES
EKG VENTRICULAR RATE: 57 BPM

## 2020-08-27 LAB
3-OH-COTININE: <2 NG/ML
COTININE: <2 NG/ML
NICOTINE: <2 NG/ML

## 2020-08-30 NOTE — PROGRESS NOTES
(137.9 kg)   [facility] 06/17/2011   BMI 49.82 kg/m²   General This patient is awake, alert, and oriented, and is in no apparent distress. Cardiac Regular rate and rhythm without evidence of murmur. Respiratory Clear to auscultation bilaterally. Abdomen Obese, soft, non-tender, non-distended without masses/ No  evidence of abdominal hernia / Incisions consistent with previous surgeries. Head and Neck Obese, normocephalic and atraumatic/soft and supple, no  lymphadenopathy or obvious bruits. Extremeties No cyanosis, clubbing or edema/ No calf tenderness/No  restrictions of movement, is ambulatory without assistance. Neurological Intact x 4 extremities, no focal deficits noted   Skin No rashes or lesions noted   Rectal Deferred     RECOMMENDATIONS:       Diagnosis Orders   1. Situs inversus totalis     2. Obesity, Class III, BMI 40-49.9 (morbid obesity) ([facility] [address]            We spent a great deal of time discussing the risks and benefits of Laparoscopic Sleeve Gastrectomy, including but not limited to injury to intra-abdominal organs, breakdown of the gastric staple line, the need for re-operative therapy,  prolonged hospitalization,  mechanical ventilation,  and death. We discussed the possibility of bleeding, the need for blood transfusions, blood clots, hospital-acquired and intra-abdominal infection, anastomotic stricture, and worsening GERD. And we discussed the need for post-operative visit compliance, behavior modifications and diet changes, protein and vitamin supplementation, as well as routine scheduled and dedicated exercise. We discussed the potential weight loss benefit of approximately 50-60% of her excess body weight at 12-18 months post-op, as well as the possibility of insufficient weight loss or weight gain after 2 years post-operative time.      The following was discussed with the patient:    DVT Prophylaxis    Risks given situs inversus, increase risk    Improvement of hyperlipidemia    Need to complete testing    I spent over 51% of the total visit time of approximately 15 minutes counseling (or coordinating care) and provided discussion regarding risks, benefits, and options referenced above, as well as surgical and post-operative program recommendations and requirements. Electronically signed by Vaibhav Khoury DO on 8/30/2020 at 2:01 PM    Please note that this chart was generated using voice recognition Dragon dictation software. Although every effort was made to ensure the accuracy of this automated transcription, some errors in transcription may have occurred.

## 2020-08-31 ENCOUNTER — TELEPHONE (OUTPATIENT)
Dept: BARIATRICS/WEIGHT MGMT | Age: 60
End: 2020-08-31

## 2020-08-31 NOTE — TELEPHONE ENCOUNTER
FMLA forms completed, plan is for 6 weeks off, completed forms faxed - see media.   Notified pt (forms placed at  for  per her request)

## 2020-09-02 ENCOUNTER — TELEPHONE (OUTPATIENT)
Dept: BARIATRICS/WEIGHT MGMT | Age: 60
End: 2020-09-02

## 2020-09-04 ENCOUNTER — HOSPITAL ENCOUNTER (OUTPATIENT)
Dept: PREADMISSION TESTING | Age: 60
Setting detail: SPECIMEN
Discharge: HOME | End: 2020-09-08
Payer: COMMERCIAL

## 2020-09-04 ENCOUNTER — TELEPHONE (OUTPATIENT)
Dept: BARIATRICS/WEIGHT MGMT | Age: 60
End: 2020-09-04

## 2020-09-04 PROCEDURE — U0003 INFECTIOUS AGENT DETECTION BY NUCLEIC ACID (DNA OR RNA); SEVERE ACUTE RESPIRATORY SYNDROME CORONAVIRUS 2 (SARS-COV-2) (CORONAVIRUS DISEASE [COVID-19]), AMPLIFIED PROBE TECHNIQUE, MAKING USE OF HIGH THROUGHPUT TECHNOLOGIES AS DESCRIBED BY CMS-2020-01-R: HCPCS

## 2020-09-04 NOTE — TELEPHONE ENCOUNTER
Oral intake on 2 week preop diet; has only had one bowel movement since starting diet  Dehydration: None  What makes it better nothing  What makes it worse nothing  Normal Bowel Pattern: a bowel movement everyday  Any history of constipation:no  Current Medications that may increase risk of constipation:  no  Current Medications for Constipation: no    Plan: Instructed patient to use milk of magnesium as directed on the bottle, until resulted. Will call back if MOM is not effective.

## 2020-09-06 LAB — SARS-COV-2, NAA: NOT DETECTED

## 2020-09-08 ENCOUNTER — ANESTHESIA (OUTPATIENT)
Dept: OPERATING ROOM | Age: 60
End: 2020-09-08
Payer: COMMERCIAL

## 2020-09-08 ENCOUNTER — HOSPITAL ENCOUNTER (OUTPATIENT)
Age: 60
Setting detail: OBSERVATION
Discharge: HOME | End: 2020-09-09
Attending: SURGERY | Admitting: SURGERY
Payer: COMMERCIAL

## 2020-09-08 ENCOUNTER — ANESTHESIA EVENT (OUTPATIENT)
Dept: OPERATING ROOM | Age: 60
End: 2020-09-08
Payer: COMMERCIAL

## 2020-09-08 VITALS — SYSTOLIC BLOOD PRESSURE: 170 MMHG | OXYGEN SATURATION: 95 % | DIASTOLIC BLOOD PRESSURE: 93 MMHG | TEMPERATURE: 94.6 F

## 2020-09-08 PROBLEM — Z98.84 STATUS POST LAPAROSCOPIC SLEEVE GASTRECTOMY: Status: ACTIVE | Noted: 2020-09-08

## 2020-09-08 LAB
ANION GAP SERPL CALCULATED.3IONS-SCNC: 11 MMOL/L (ref 9–17)
BUN BLDV-MCNC: 15 MG/DL (ref 6–20)
BUN/CREAT BLD: ABNORMAL (ref 9–20)
CALCIUM SERPL-MCNC: 8.7 MG/DL (ref 8.6–10.4)
CHLORIDE BLD-SCNC: 102 MMOL/L (ref 98–107)
CO2: 20 MMOL/L (ref 20–31)
CREAT SERPL-MCNC: 0.72 MG/DL (ref 0.5–0.9)
GFR AFRICAN AMERICAN: >60 ML/MIN
GFR NON-AFRICAN AMERICAN: >60 ML/MIN
GFR SERPL CREATININE-BSD FRML MDRD: ABNORMAL ML/MIN/{1.73_M2}
GFR SERPL CREATININE-BSD FRML MDRD: ABNORMAL ML/MIN/{1.73_M2}
GLUCOSE BLD-MCNC: 180 MG/DL (ref 70–99)
HCT VFR BLD CALC: 48.1 % (ref 36.3–47.1)
HEMOGLOBIN: 14.1 G/DL (ref 11.9–15.1)
MCH RBC QN AUTO: 29 PG (ref 25.2–33.5)
MCHC RBC AUTO-ENTMCNC: 29.3 G/DL (ref 28.4–34.8)
MCV RBC AUTO: 99 FL (ref 82.6–102.9)
NRBC AUTOMATED: 0 PER 100 WBC
PDW BLD-RTO: 12.3 % (ref 11.8–14.4)
PLATELET # BLD: 271 K/UL (ref 138–453)
PMV BLD AUTO: 11.4 FL (ref 8.1–13.5)
POTASSIUM SERPL-SCNC: 4.9 MMOL/L (ref 3.7–5.3)
RBC # BLD: 4.86 M/UL (ref 3.95–5.11)
SODIUM BLD-SCNC: 133 MMOL/L (ref 135–144)
WBC # BLD: 12.8 K/UL (ref 3.5–11.3)

## 2020-09-08 PROCEDURE — 2700000000 HC OXYGEN THERAPY PER DAY

## 2020-09-08 PROCEDURE — 88307 TISSUE EXAM BY PATHOLOGIST: CPT

## 2020-09-08 PROCEDURE — 2500000003 HC RX 250 WO HCPCS: Performed by: ANESTHESIOLOGY

## 2020-09-08 PROCEDURE — L3650 SO 8 ABD RESTRAINT PRE OTS: HCPCS | Performed by: SURGERY

## 2020-09-08 PROCEDURE — 2500000003 HC RX 250 WO HCPCS: Performed by: NURSE ANESTHETIST, CERTIFIED REGISTERED

## 2020-09-08 PROCEDURE — 94761 N-INVAS EAR/PLS OXIMETRY MLT: CPT

## 2020-09-08 PROCEDURE — 6360000002 HC RX W HCPCS

## 2020-09-08 PROCEDURE — 3700000000 HC ANESTHESIA ATTENDED CARE: Performed by: SURGERY

## 2020-09-08 PROCEDURE — 80048 BASIC METABOLIC PNL TOTAL CA: CPT

## 2020-09-08 PROCEDURE — 3600000009 HC SURGERY ROBOT BASE: Performed by: SURGERY

## 2020-09-08 PROCEDURE — 6370000000 HC RX 637 (ALT 250 FOR IP): Performed by: SURGERY

## 2020-09-08 PROCEDURE — 85027 COMPLETE CBC AUTOMATED: CPT

## 2020-09-08 PROCEDURE — 2580000003 HC RX 258: Performed by: SURGERY

## 2020-09-08 PROCEDURE — 7100000000 HC PACU RECOVERY - FIRST 15 MIN: Performed by: SURGERY

## 2020-09-08 PROCEDURE — 6360000002 HC RX W HCPCS: Performed by: SURGERY

## 2020-09-08 PROCEDURE — 2709999900 HC NON-CHARGEABLE SUPPLY: Performed by: SURGERY

## 2020-09-08 PROCEDURE — 7100000001 HC PACU RECOVERY - ADDTL 15 MIN: Performed by: SURGERY

## 2020-09-08 PROCEDURE — G0378 HOSPITAL OBSERVATION PER HR: HCPCS

## 2020-09-08 PROCEDURE — 2580000003 HC RX 258: Performed by: ANESTHESIOLOGY

## 2020-09-08 PROCEDURE — 2500000003 HC RX 250 WO HCPCS: Performed by: SURGERY

## 2020-09-08 PROCEDURE — 3600000019 HC SURGERY ROBOT ADDTL 15MIN: Performed by: SURGERY

## 2020-09-08 PROCEDURE — 2720000010 HC SURG SUPPLY STERILE: Performed by: SURGERY

## 2020-09-08 PROCEDURE — 6360000002 HC RX W HCPCS: Performed by: ANESTHESIOLOGY

## 2020-09-08 PROCEDURE — 3700000001 HC ADD 15 MINUTES (ANESTHESIA): Performed by: SURGERY

## 2020-09-08 PROCEDURE — 94640 AIRWAY INHALATION TREATMENT: CPT

## 2020-09-08 PROCEDURE — S2900 ROBOTIC SURGICAL SYSTEM: HCPCS | Performed by: SURGERY

## 2020-09-08 PROCEDURE — 43775 LAP SLEEVE GASTRECTOMY: CPT | Performed by: SURGERY

## 2020-09-08 PROCEDURE — 6360000002 HC RX W HCPCS: Performed by: NURSE ANESTHETIST, CERTIFIED REGISTERED

## 2020-09-08 RX ORDER — SODIUM CHLORIDE, SODIUM LACTATE, POTASSIUM CHLORIDE, CALCIUM CHLORIDE 600; 310; 30; 20 MG/100ML; MG/100ML; MG/100ML; MG/100ML
1000 INJECTION, SOLUTION INTRAVENOUS CONTINUOUS
Status: DISCONTINUED | OUTPATIENT
Start: 2020-09-08 | End: 2020-09-09 | Stop reason: HOSPADM

## 2020-09-08 RX ORDER — SCOLOPAMINE TRANSDERMAL SYSTEM 1 MG/1
1 PATCH, EXTENDED RELEASE TRANSDERMAL
Status: DISCONTINUED | OUTPATIENT
Start: 2020-09-08 | End: 2020-09-09 | Stop reason: HOSPADM

## 2020-09-08 RX ORDER — FENTANYL CITRATE 50 UG/ML
100 INJECTION, SOLUTION INTRAMUSCULAR; INTRAVENOUS ONCE
Status: COMPLETED | OUTPATIENT
Start: 2020-09-08 | End: 2020-09-08

## 2020-09-08 RX ORDER — MAGNESIUM HYDROXIDE 1200 MG/15ML
LIQUID ORAL CONTINUOUS PRN
Status: COMPLETED | OUTPATIENT
Start: 2020-09-08 | End: 2020-09-08

## 2020-09-08 RX ORDER — PROMETHAZINE HYDROCHLORIDE 25 MG/ML
12.5 INJECTION, SOLUTION INTRAMUSCULAR; INTRAVENOUS EVERY 6 HOURS PRN
Status: DISCONTINUED | OUTPATIENT
Start: 2020-09-08 | End: 2020-09-09 | Stop reason: HOSPADM

## 2020-09-08 RX ORDER — OXYCODONE HYDROCHLORIDE AND ACETAMINOPHEN 5; 325 MG/1; MG/1
1 TABLET ORAL EVERY 6 HOURS PRN
Qty: 28 TABLET | Refills: 0 | Status: SHIPPED | OUTPATIENT
Start: 2020-09-08 | End: 2020-09-15

## 2020-09-08 RX ORDER — FENTANYL CITRATE 50 UG/ML
25 INJECTION, SOLUTION INTRAMUSCULAR; INTRAVENOUS EVERY 5 MIN PRN
Status: DISCONTINUED | OUTPATIENT
Start: 2020-09-08 | End: 2020-09-08 | Stop reason: HOSPADM

## 2020-09-08 RX ORDER — OXYCODONE HYDROCHLORIDE AND ACETAMINOPHEN 5; 325 MG/1; MG/1
2 TABLET ORAL PRN
Status: DISCONTINUED | OUTPATIENT
Start: 2020-09-08 | End: 2020-09-08 | Stop reason: HOSPADM

## 2020-09-08 RX ORDER — SODIUM CHLORIDE 0.9 % (FLUSH) 0.9 %
10 SYRINGE (ML) INJECTION PRN
Status: DISCONTINUED | OUTPATIENT
Start: 2020-09-08 | End: 2020-09-09 | Stop reason: HOSPADM

## 2020-09-08 RX ORDER — DEXAMETHASONE SODIUM PHOSPHATE 4 MG/ML
4 INJECTION, SOLUTION INTRA-ARTICULAR; INTRALESIONAL; INTRAMUSCULAR; INTRAVENOUS; SOFT TISSUE
Status: DISCONTINUED | OUTPATIENT
Start: 2020-09-08 | End: 2020-09-08 | Stop reason: HOSPADM

## 2020-09-08 RX ORDER — METOPROLOL TARTRATE 5 MG/5ML
5 INJECTION INTRAVENOUS EVERY 6 HOURS PRN
Status: DISCONTINUED | OUTPATIENT
Start: 2020-09-08 | End: 2020-09-09 | Stop reason: HOSPADM

## 2020-09-08 RX ORDER — ONDANSETRON 2 MG/ML
INJECTION INTRAMUSCULAR; INTRAVENOUS PRN
Status: DISCONTINUED | OUTPATIENT
Start: 2020-09-08 | End: 2020-09-08 | Stop reason: SDUPTHER

## 2020-09-08 RX ORDER — SODIUM CHLORIDE 0.9 % (FLUSH) 0.9 %
10 SYRINGE (ML) INJECTION EVERY 12 HOURS SCHEDULED
Status: DISCONTINUED | OUTPATIENT
Start: 2020-09-08 | End: 2020-09-09 | Stop reason: HOSPADM

## 2020-09-08 RX ORDER — GLYCOPYRROLATE 0.2 MG/ML
INJECTION INTRAMUSCULAR; INTRAVENOUS
Status: COMPLETED
Start: 2020-09-08 | End: 2020-09-08

## 2020-09-08 RX ORDER — GABAPENTIN 600 MG/1
300 TABLET ORAL 2 TIMES DAILY
Status: DISCONTINUED | OUTPATIENT
Start: 2020-09-08 | End: 2020-09-09 | Stop reason: HOSPADM

## 2020-09-08 RX ORDER — IPRATROPIUM BROMIDE AND ALBUTEROL SULFATE 2.5; .5 MG/3ML; MG/3ML
1 SOLUTION RESPIRATORY (INHALATION)
Status: DISCONTINUED | OUTPATIENT
Start: 2020-09-08 | End: 2020-09-09 | Stop reason: HOSPADM

## 2020-09-08 RX ORDER — DIPHENHYDRAMINE HYDROCHLORIDE 50 MG/ML
12.5 INJECTION INTRAMUSCULAR; INTRAVENOUS
Status: DISCONTINUED | OUTPATIENT
Start: 2020-09-08 | End: 2020-09-08 | Stop reason: HOSPADM

## 2020-09-08 RX ORDER — PROMETHAZINE HYDROCHLORIDE 25 MG/1
25 TABLET ORAL 4 TIMES DAILY PRN
Qty: 20 TABLET | Refills: 0 | Status: SHIPPED | OUTPATIENT
Start: 2020-09-08 | End: 2020-09-15

## 2020-09-08 RX ORDER — GLYCOPYRROLATE 0.2 MG/ML
INJECTION INTRAMUSCULAR; INTRAVENOUS PRN
Status: DISCONTINUED | OUTPATIENT
Start: 2020-09-08 | End: 2020-09-08 | Stop reason: SDUPTHER

## 2020-09-08 RX ORDER — HEPARIN SODIUM 5000 [USP'U]/ML
5000 INJECTION, SOLUTION INTRAVENOUS; SUBCUTANEOUS EVERY 8 HOURS SCHEDULED
Status: DISCONTINUED | OUTPATIENT
Start: 2020-09-08 | End: 2020-09-09 | Stop reason: HOSPADM

## 2020-09-08 RX ORDER — DEXAMETHASONE SODIUM PHOSPHATE 10 MG/ML
INJECTION, SOLUTION INTRAMUSCULAR; INTRAVENOUS PRN
Status: DISCONTINUED | OUTPATIENT
Start: 2020-09-08 | End: 2020-09-08 | Stop reason: SDUPTHER

## 2020-09-08 RX ORDER — ONDANSETRON 2 MG/ML
4 INJECTION INTRAMUSCULAR; INTRAVENOUS EVERY 6 HOURS PRN
Status: DISCONTINUED | OUTPATIENT
Start: 2020-09-08 | End: 2020-09-09 | Stop reason: HOSPADM

## 2020-09-08 RX ORDER — LABETALOL HYDROCHLORIDE 5 MG/ML
5 INJECTION, SOLUTION INTRAVENOUS EVERY 10 MIN PRN
Status: DISCONTINUED | OUTPATIENT
Start: 2020-09-08 | End: 2020-09-08 | Stop reason: HOSPADM

## 2020-09-08 RX ORDER — ONDANSETRON 2 MG/ML
4 INJECTION INTRAMUSCULAR; INTRAVENOUS
Status: DISCONTINUED | OUTPATIENT
Start: 2020-09-08 | End: 2020-09-08 | Stop reason: HOSPADM

## 2020-09-08 RX ORDER — BUPIVACAINE HYDROCHLORIDE 5 MG/ML
INJECTION, SOLUTION PERINEURAL PRN
Status: DISCONTINUED | OUTPATIENT
Start: 2020-09-08 | End: 2020-09-08 | Stop reason: HOSPADM

## 2020-09-08 RX ORDER — GLYCOPYRROLATE 1 MG/5 ML
SYRINGE (ML) INTRAVENOUS PRN
Status: DISCONTINUED | OUTPATIENT
Start: 2020-09-08 | End: 2020-09-08 | Stop reason: SDUPTHER

## 2020-09-08 RX ORDER — MEPERIDINE HYDROCHLORIDE 50 MG/ML
12.5 INJECTION INTRAMUSCULAR; INTRAVENOUS; SUBCUTANEOUS EVERY 5 MIN PRN
Status: DISCONTINUED | OUTPATIENT
Start: 2020-09-08 | End: 2020-09-08 | Stop reason: HOSPADM

## 2020-09-08 RX ORDER — SODIUM CHLORIDE, SODIUM LACTATE, POTASSIUM CHLORIDE, CALCIUM CHLORIDE 600; 310; 30; 20 MG/100ML; MG/100ML; MG/100ML; MG/100ML
INJECTION, SOLUTION INTRAVENOUS CONTINUOUS
Status: DISCONTINUED | OUTPATIENT
Start: 2020-09-08 | End: 2020-09-09 | Stop reason: HOSPADM

## 2020-09-08 RX ORDER — MIDAZOLAM HYDROCHLORIDE 1 MG/ML
INJECTION INTRAMUSCULAR; INTRAVENOUS PRN
Status: DISCONTINUED | OUTPATIENT
Start: 2020-09-08 | End: 2020-09-08 | Stop reason: SDUPTHER

## 2020-09-08 RX ORDER — HEPARIN SODIUM 5000 [USP'U]/ML
5000 INJECTION, SOLUTION INTRAVENOUS; SUBCUTANEOUS ONCE
Status: COMPLETED | OUTPATIENT
Start: 2020-09-08 | End: 2020-09-08

## 2020-09-08 RX ORDER — PROMETHAZINE HYDROCHLORIDE 25 MG/1
25 TABLET ORAL EVERY 6 HOURS PRN
Status: DISCONTINUED | OUTPATIENT
Start: 2020-09-08 | End: 2020-09-09 | Stop reason: HOSPADM

## 2020-09-08 RX ORDER — BUPIVACAINE HYDROCHLORIDE 5 MG/ML
200 INJECTION, SOLUTION EPIDURAL; INTRACAUDAL ONCE
Status: COMPLETED | OUTPATIENT
Start: 2020-09-08 | End: 2020-09-08

## 2020-09-08 RX ORDER — MIDAZOLAM HYDROCHLORIDE 1 MG/ML
2 INJECTION INTRAMUSCULAR; INTRAVENOUS ONCE
Status: COMPLETED | OUTPATIENT
Start: 2020-09-08 | End: 2020-09-08

## 2020-09-08 RX ORDER — OXYCODONE HYDROCHLORIDE AND ACETAMINOPHEN 5; 325 MG/1; MG/1
1 TABLET ORAL PRN
Status: DISCONTINUED | OUTPATIENT
Start: 2020-09-08 | End: 2020-09-08 | Stop reason: HOSPADM

## 2020-09-08 RX ORDER — NEOSTIGMINE METHYLSULFATE 5 MG/5 ML
SYRINGE (ML) INTRAVENOUS PRN
Status: DISCONTINUED | OUTPATIENT
Start: 2020-09-08 | End: 2020-09-08 | Stop reason: SDUPTHER

## 2020-09-08 RX ORDER — PROPOFOL 10 MG/ML
INJECTION, EMULSION INTRAVENOUS PRN
Status: DISCONTINUED | OUTPATIENT
Start: 2020-09-08 | End: 2020-09-08 | Stop reason: SDUPTHER

## 2020-09-08 RX ORDER — HYDRALAZINE HYDROCHLORIDE 20 MG/ML
5 INJECTION INTRAMUSCULAR; INTRAVENOUS EVERY 10 MIN PRN
Status: DISCONTINUED | OUTPATIENT
Start: 2020-09-08 | End: 2020-09-08 | Stop reason: HOSPADM

## 2020-09-08 RX ORDER — FENTANYL CITRATE 50 UG/ML
INJECTION, SOLUTION INTRAMUSCULAR; INTRAVENOUS PRN
Status: DISCONTINUED | OUTPATIENT
Start: 2020-09-08 | End: 2020-09-08 | Stop reason: SDUPTHER

## 2020-09-08 RX ORDER — OXYCODONE HYDROCHLORIDE AND ACETAMINOPHEN 5; 325 MG/1; MG/1
2 TABLET ORAL EVERY 4 HOURS PRN
Status: DISCONTINUED | OUTPATIENT
Start: 2020-09-08 | End: 2020-09-09 | Stop reason: HOSPADM

## 2020-09-08 RX ORDER — ROCURONIUM BROMIDE 10 MG/ML
INJECTION, SOLUTION INTRAVENOUS PRN
Status: DISCONTINUED | OUTPATIENT
Start: 2020-09-08 | End: 2020-09-08 | Stop reason: SDUPTHER

## 2020-09-08 RX ADMIN — SODIUM CHLORIDE, POTASSIUM CHLORIDE, SODIUM LACTATE AND CALCIUM CHLORIDE: 600; 310; 30; 20 INJECTION, SOLUTION INTRAVENOUS at 09:20

## 2020-09-08 RX ADMIN — DEXTROSE MONOHYDRATE 3 G: 50 INJECTION, SOLUTION INTRAVENOUS at 17:43

## 2020-09-08 RX ADMIN — MIDAZOLAM HYDROCHLORIDE 2 MG: 1 INJECTION, SOLUTION INTRAMUSCULAR; INTRAVENOUS at 08:48

## 2020-09-08 RX ADMIN — HEPARIN SODIUM 5000 UNITS: 5000 INJECTION INTRAVENOUS; SUBCUTANEOUS at 08:58

## 2020-09-08 RX ADMIN — Medication 0.5 MG: at 13:04

## 2020-09-08 RX ADMIN — Medication 0.4 MG: at 12:00

## 2020-09-08 RX ADMIN — PROPOFOL 200 MG: 10 INJECTION, EMULSION INTRAVENOUS at 09:24

## 2020-09-08 RX ADMIN — FENTANYL CITRATE 100 MCG: 50 INJECTION INTRAMUSCULAR; INTRAVENOUS at 09:28

## 2020-09-08 RX ADMIN — HEPARIN SODIUM 5000 UNITS: 5000 INJECTION INTRAVENOUS; SUBCUTANEOUS at 22:31

## 2020-09-08 RX ADMIN — FENTANYL CITRATE 50 MCG: 50 INJECTION, SOLUTION INTRAMUSCULAR; INTRAVENOUS at 08:49

## 2020-09-08 RX ADMIN — HYDROMORPHONE HYDROCHLORIDE 1 MG: 1 INJECTION, SOLUTION INTRAMUSCULAR; INTRAVENOUS; SUBCUTANEOUS at 16:34

## 2020-09-08 RX ADMIN — Medication 3 MG: at 12:00

## 2020-09-08 RX ADMIN — SODIUM CHLORIDE, POTASSIUM CHLORIDE, SODIUM LACTATE AND CALCIUM CHLORIDE 1000 ML: 600; 310; 30; 20 INJECTION, SOLUTION INTRAVENOUS at 08:20

## 2020-09-08 RX ADMIN — HYDROMORPHONE HYDROCHLORIDE 0.5 MG: 1 INJECTION, SOLUTION INTRAMUSCULAR; INTRAVENOUS; SUBCUTANEOUS at 13:04

## 2020-09-08 RX ADMIN — Medication 200 MG: at 08:54

## 2020-09-08 RX ADMIN — Medication 3 G: at 09:44

## 2020-09-08 RX ADMIN — FAMOTIDINE 20 MG: 10 INJECTION INTRAVENOUS at 22:31

## 2020-09-08 RX ADMIN — DEXAMETHASONE SODIUM PHOSPHATE 10 MG: 10 INJECTION, SOLUTION INTRAMUSCULAR; INTRAVENOUS at 09:54

## 2020-09-08 RX ADMIN — ROCURONIUM BROMIDE 50 MG: 10 INJECTION INTRAVENOUS at 09:28

## 2020-09-08 RX ADMIN — MIDAZOLAM HYDROCHLORIDE 2 MG: 1 INJECTION, SOLUTION INTRAMUSCULAR; INTRAVENOUS at 09:28

## 2020-09-08 RX ADMIN — FENTANYL CITRATE 50 MCG: 50 INJECTION INTRAMUSCULAR; INTRAVENOUS at 11:21

## 2020-09-08 RX ADMIN — FENTANYL CITRATE 50 MCG: 50 INJECTION INTRAMUSCULAR; INTRAVENOUS at 11:11

## 2020-09-08 RX ADMIN — Medication 5 MG: at 10:00

## 2020-09-08 RX ADMIN — ONDANSETRON 4 MG: 2 INJECTION INTRAMUSCULAR; INTRAVENOUS at 11:06

## 2020-09-08 RX ADMIN — SODIUM CHLORIDE, POTASSIUM CHLORIDE, SODIUM LACTATE AND CALCIUM CHLORIDE: 600; 310; 30; 20 INJECTION, SOLUTION INTRAVENOUS at 10:20

## 2020-09-08 RX ADMIN — HYDROMORPHONE HYDROCHLORIDE 1 MG: 1 INJECTION, SOLUTION INTRAMUSCULAR; INTRAVENOUS; SUBCUTANEOUS at 20:24

## 2020-09-08 RX ADMIN — GLYCOPYRROLATE 0.4 MG: 0.2 INJECTION INTRAMUSCULAR; INTRAVENOUS at 12:20

## 2020-09-08 RX ADMIN — SODIUM CHLORIDE, POTASSIUM CHLORIDE, SODIUM LACTATE AND CALCIUM CHLORIDE: 600; 310; 30; 20 INJECTION, SOLUTION INTRAVENOUS at 20:23

## 2020-09-08 RX ADMIN — Medication 5 MG: at 12:06

## 2020-09-08 RX ADMIN — IPRATROPIUM BROMIDE AND ALBUTEROL SULFATE 1 AMPULE: .5; 3 SOLUTION RESPIRATORY (INHALATION) at 15:59

## 2020-09-08 ASSESSMENT — PULMONARY FUNCTION TESTS
PIF_VALUE: 25
PIF_VALUE: 24
PIF_VALUE: 24
PIF_VALUE: 21
PIF_VALUE: 24
PIF_VALUE: 25
PIF_VALUE: 0
PIF_VALUE: 22
PIF_VALUE: 21
PIF_VALUE: 20
PIF_VALUE: 0
PIF_VALUE: 25
PIF_VALUE: 23
PIF_VALUE: 25
PIF_VALUE: 3
PIF_VALUE: 20
PIF_VALUE: 25
PIF_VALUE: 25
PIF_VALUE: 24
PIF_VALUE: 23
PIF_VALUE: 25
PIF_VALUE: 25
PIF_VALUE: 20
PIF_VALUE: 25
PIF_VALUE: 24
PIF_VALUE: 25
PIF_VALUE: 22
PIF_VALUE: 25
PIF_VALUE: 25
PIF_VALUE: 30
PIF_VALUE: 25
PIF_VALUE: 25
PIF_VALUE: 23
PIF_VALUE: 4
PIF_VALUE: 25
PIF_VALUE: 0
PIF_VALUE: 25
PIF_VALUE: 24
PIF_VALUE: 25
PIF_VALUE: 24
PIF_VALUE: 25
PIF_VALUE: 3
PIF_VALUE: 4
PIF_VALUE: 21
PIF_VALUE: 25
PIF_VALUE: 25
PIF_VALUE: 35
PIF_VALUE: 25
PIF_VALUE: 25
PIF_VALUE: 24
PIF_VALUE: 25
PIF_VALUE: 24
PIF_VALUE: 25
PIF_VALUE: 24
PIF_VALUE: 25
PIF_VALUE: 22
PIF_VALUE: 24
PIF_VALUE: 26
PIF_VALUE: 21
PIF_VALUE: 25
PIF_VALUE: 20
PIF_VALUE: 23
PIF_VALUE: 0
PIF_VALUE: 25
PIF_VALUE: 23
PIF_VALUE: 19
PIF_VALUE: 25
PIF_VALUE: 26
PIF_VALUE: 25
PIF_VALUE: 4
PIF_VALUE: 26
PIF_VALUE: 19
PIF_VALUE: 25
PIF_VALUE: 19
PIF_VALUE: 24
PIF_VALUE: 25
PIF_VALUE: 24
PIF_VALUE: 24
PIF_VALUE: 25
PIF_VALUE: 35
PIF_VALUE: 25
PIF_VALUE: 0
PIF_VALUE: 25
PIF_VALUE: 21
PIF_VALUE: 23
PIF_VALUE: 24
PIF_VALUE: 3
PIF_VALUE: 25
PIF_VALUE: 0
PIF_VALUE: 25
PIF_VALUE: 20
PIF_VALUE: 25
PIF_VALUE: 25
PIF_VALUE: 24
PIF_VALUE: 25
PIF_VALUE: 24
PIF_VALUE: 21
PIF_VALUE: 4
PIF_VALUE: 8
PIF_VALUE: 25
PIF_VALUE: 24
PIF_VALUE: 20
PIF_VALUE: 25
PIF_VALUE: 0
PIF_VALUE: 3
PIF_VALUE: 25
PIF_VALUE: 23
PIF_VALUE: 25
PIF_VALUE: 25
PIF_VALUE: 23
PIF_VALUE: 25
PIF_VALUE: 25
PIF_VALUE: 20
PIF_VALUE: 25
PIF_VALUE: 3
PIF_VALUE: 23
PIF_VALUE: 25
PIF_VALUE: 22
PIF_VALUE: 20
PIF_VALUE: 25
PIF_VALUE: 0
PIF_VALUE: 22
PIF_VALUE: 25
PIF_VALUE: 18
PIF_VALUE: 27
PIF_VALUE: 25

## 2020-09-08 ASSESSMENT — PAIN DESCRIPTION - ORIENTATION: ORIENTATION: MID

## 2020-09-08 ASSESSMENT — PAIN SCALES - GENERAL
PAINLEVEL_OUTOF10: 0
PAINLEVEL_OUTOF10: 8
PAINLEVEL_OUTOF10: 8
PAINLEVEL_OUTOF10: 0
PAINLEVEL_OUTOF10: 8
PAINLEVEL_OUTOF10: 8
PAINLEVEL_OUTOF10: 6
PAINLEVEL_OUTOF10: 0
PAINLEVEL_OUTOF10: 6

## 2020-09-08 ASSESSMENT — PAIN DESCRIPTION - LOCATION
LOCATION: ABDOMEN

## 2020-09-08 ASSESSMENT — PAIN - FUNCTIONAL ASSESSMENT: PAIN_FUNCTIONAL_ASSESSMENT: 0-10

## 2020-09-08 ASSESSMENT — PAIN DESCRIPTION - PAIN TYPE
TYPE: SURGICAL PAIN

## 2020-09-08 NOTE — BRIEF OP NOTE
Brief Postoperative Note      Patient: Riccardo Alvarado  YOB: 1960  MRN: [id number]    Date of Procedure: 9/8/2020    Pre-Op Diagnosis: MORBID OBESITY, SITUS INVERUS, PRE-DIABETES    Post-Op Diagnosis: Same       Procedure(s):  LAPAROSCOPIC XI ROBOTIC GASTRECTOMY SLEEVE, EGD    Surgeon(s):  Beena Espinoza DO    Assistant:  First Assistant: Connie Gonzales  Resident: Laurel Garcia DO    Anesthesia: General    Estimated Blood Loss (mL): Minimal    Complications: None    Specimens:   ID Type Source Tests Collected by Time Destination   A : GASTRIC REMNANT Tissue Gastric SURGICAL PATHOLOGY Beena Espinoza DO 9/8/2020 1150        Implants:  * No implants in log *      Drains:   Closed/Suction Drain LUQ (Active)       Findings: see note, situs inversus    Electronically signed by Beena Espinoza DO on 9/8/2020 at 12:03 PM

## 2020-09-08 NOTE — ANESTHESIA PRE PROCEDURE
Department of Anesthesiology  Preprocedure Note       Name:  Maria Luisa Baker   Age:  64 y.o.  :  1960                                          MRN:  [id number]         Date:  2020      Surgeon: Ken Resendez):  Jethro Damian DO    Procedure: Procedure(s):  LAPAROSCOPIC XI ROBOTIC GASTRECTOMY SLEEVE, LIVER BIOPSY, EGD - GI UNIT SCHEDULED. Medications prior to admission:   Prior to Admission medications    Medication Sig Start Date End Date Taking? Authorizing Provider   gabapentin (NEURONTIN) 300 MG capsule Take 1 capsule by mouth daily for 2 days. Take one pill the night before and one the morning of surgery 20  Jethro Damian DO   acetaminophen (TYLENOL) 500 MG tablet Take 1,000 mg by mouth every 6 hours as needed for Pain    Historical Provider, MD   Meloxicam (MOBIC PO) Take by mouth daily Stopping     Historical Provider, MD       Current medications:    No current facility-administered medications for this encounter. 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 Werner Gave Hearing loss     no hearing aid at this time    HLD (hyperlipidemia)     Kartagener syndrome age 24    diagnosed age 24; bronchiectasis, chronic sinus congestion, situs inversus    Obesity     Osteoarthritis     Snores     denies apnea    Wears prescription eyeglasses     Wellness examination     SUSIE Davies-PCP [facility]       Past Surgical History:

## 2020-09-08 NOTE — INTERVAL H&P NOTE
Pt Name: Lazara Shannon  MRN: [id number]  YOB: 1960  Date of evaluation: 9/8/2020    I have reviewed the patient's history and physical examination completed in pre-admission testing on 8/24/20    No changes to history or on examination today, unless noted below. Audible nasal tone to her voice, she reports chronic nasal issues r/t Kartagener syndrome. No acute respiratory complaints today, no distress. Updated cardiac clearance on file. Negative covid-19 screening on 9/4/20.      CATALINA RANDALL APRN-CNP  9/8/20  8:11 AM

## 2020-09-08 NOTE — ANESTHESIA PRE PROCEDURE
Department of Anesthesiology  Preprocedure Note       Name:  Karen Martinez   Age:  64 y.o.  :  1960                                          MRN:  [id number]         Date:  2020      Surgeon: Akiko Suarez):  Laureen Peralta DO    Procedure: Procedure(s):  LAPAROSCOPIC XI ROBOTIC GASTRECTOMY SLEEVE, LIVER BIOPSY, EGD - GI UNIT SCHEDULED. Medications prior to admission:   Prior to Admission medications    Medication Sig Start Date End Date Taking? Authorizing Provider   gabapentin (NEURONTIN) 300 MG capsule Take 1 capsule by mouth daily for 2 days.  Take one pill the night before and one the morning of surgery 20  Laureen Peralta DO   acetaminophen (TYLENOL) 500 MG tablet Take 1,000 mg by mouth every 6 hours as needed for Pain    Historical Provider, MD   Meloxicam (MOBIC PO) Take by mouth daily Stopping     Historical Provider, MD       Current medications:    Current Facility-Administered Medications   Medication Dose Route Frequency Provider Last Rate Last Dose    ceFAZolin (ANCEF) 3 g in dextrose 5 % 100 mL IVPB  3 g Intravenous Once Laureen Peralta DO        heparin (porcine) injection 5,000 Units  5,000 Units Subcutaneous Once Laureen Peralta DO        lactated ringers infusion 1,000 mL  1,000 mL Intravenous Continuous Beatriz Ramos MD           Allergies:  No Known Allergies    Problem List:    Patient Active Problem List   Diagnosis Code    History of elevated lipids Z86.39    Arthritis M19.90    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 Ángel Pack Hearing loss     no hearing aid at this time    HLD (hyperlipidemia)     Kartagener syndrome age 24    diagnosed age 24; bronchiectasis, chronic sinus congestion, situs inversus    Obesity     Osteoarthritis     Snores     denies apnea    Wears prescription eyeglasses     Wellness examination     SUSIE Davies-PCP [facility]       Past Surgical History:        Procedure Laterality Date    COLONOSCOPY      FOOT SURGERY      rt foot (hardware remains)    INNER EAR SURGERY      rt / reconstruction    KNEE SURGERY      bilateral    NASAL SEPTUM SURGERY      TONSILLECTOMY      adenoids    UPPER GASTROINTESTINAL ENDOSCOPY N/A 2020    EGD BIOPSY performed by Jeremy Estevez DO at [facility] History:    Social History     Tobacco Use    Smoking status: Former Smoker     Last attempt to quit: 1993     Years since quittin.7    Smokeless tobacco: Never Used   Substance Use Topics    Alcohol use: Yes     Comment: social                                Counseling given: Not Answered      Vital Signs (Current): There were no vitals filed for this visit.                                            BP Readings from Last 3 Encounters:   20 (!) 151/85   20 127/70   20 118/84       NPO Status:                                                                                 BMI:   Wt Readings from Last 3 Encounters:   20 (!) 309 lb 1.9 oz (140.2 kg)   20 (!) 304 lb (137.9 kg)   20 300 lb (136.1 kg)     There is no height or weight on file to calculate BMI.    CBC:   Lab Results   Component Value Date    WBC 6.8 2020    RBC 4.57 2020    HGB 13.1 2020    HCT 42.8 2020    MCV 93.7 2020    RDW 12.8 2020     2020       CMP:   Lab Results   Component Value Date     2020    K 4.6 2020     2020    CO2 27 2020    BUN 12 2020    CREATININE 0.46 2020    GFRAA >60 2020    LABGLOM >60 2020    GLUCOSE 100 08/24/2020    PROT 8.0 12/03/2019    CALCIUM 8.9 08/24/2020    BILITOT 0.30 12/03/2019    ALKPHOS 67 12/03/2019    AST 15 12/03/2019    ALT 7 12/03/2019       POC Tests: No results for input(s): POCGLU, POCNA, POCK, POCCL, POCBUN, POCHEMO, POCHCT in the last 72 hours.     Coags:   Lab Results   Component Value Date    PROTIME 9.3 08/24/2020    INR 0.9 08/24/2020    APTT 26.2 08/24/2020       HCG (If Applicable): No results found for: PREGTESTUR, PREGSERUM, HCG, HCGQUANT     ABGs: No results found for: PHART, PO2ART, OOS4SNA, PUV3JGD, BEART, T8BRMULM     Type & Screen (If Applicable):  No results found for: LABABO, LABRH    Drug/Infectious Status (If Applicable):  No results found for: HIV, HEPCAB    COVID-19 Screening (If Applicable):   Lab Results   Component Value Date    COVID19 Not Detected 09/04/2020    COVID19 Not Detected 06/13/2020         Anesthesia Evaluation     Anesthesia Plan        Sincere Uribe MD   9/8/2020

## 2020-09-08 NOTE — CARE COORDINATION
Case Management Initial Discharge Plan  Serina Dunlap,             Met with:patient to discuss discharge plans. Information verified: address, contacts, phone number, , insurance Yes    Emergency Contact/Next of Kin name & number: Tiffanie Regan [phone]    PCP: Mansoor Owen  Date of last visit: May    Insurance Provider: Kelsey Roberts    Discharge Planning    Living Arrangements:  Family Members  Pt states she lives alone  Support Systems:  Family Members    Home has 1 stories  Zero stairs to climb to get into front door, stairs to climb to reach second floor  Location of bedroom/bathroom in home     Patient able to perform ADL's:Independent    Current Services (outpatient & in home) none  DME equipment: none  DME provider:     Receiving oral anticoagulation therapy? No    If indicated:   Physician managing anticoagulation treatment: na  Where does patient obtain lab work for ATC treatment? Potential Assistance Needed:  N/A    Patient agreeable to home care: No  [place] of choice provided:  n/a    Prior SNF/Rehab Placement and Facility: none  Agreeable to SNF/Rehab: No  [place] of choice provided: n/a     Evaluation: no    Expected Discharge date:  20    Patient expects to be discharged to:  home  Follow Up Appointment: Best Day/ Time: Monday AM    Transportation provider: Karon willson  Transportation arrangements needed for discharge: No    Readmission Risk              Risk of Unplanned Readmission:        0             Does patient have a readmission risk score greater than 14?: No  If yes, follow-up appointment must be made within 7 days of discharge.      Goals of Care: able to take fluid and protein drink      Discharge Plan: Home w/niece no needs identified          Electronically signed by Eunice Carmichael RN on 20 at 5:48 PM EDT

## 2020-09-09 ENCOUNTER — TELEPHONE (OUTPATIENT)
Dept: BARIATRICS/WEIGHT MGMT | Age: 60
End: 2020-09-09

## 2020-09-09 ENCOUNTER — APPOINTMENT (OUTPATIENT)
Dept: GENERAL RADIOLOGY | Age: 60
End: 2020-09-09
Attending: SURGERY
Payer: COMMERCIAL

## 2020-09-09 VITALS
HEART RATE: 59 BPM | HEIGHT: 66 IN | BODY MASS INDEX: 47.01 KG/M2 | SYSTOLIC BLOOD PRESSURE: 112 MMHG | RESPIRATION RATE: 16 BRPM | WEIGHT: 292.5 LBS | DIASTOLIC BLOOD PRESSURE: 49 MMHG | TEMPERATURE: 97.9 F | OXYGEN SATURATION: 98 %

## 2020-09-09 LAB
ANION GAP SERPL CALCULATED.3IONS-SCNC: 12 MMOL/L (ref 9–17)
BUN BLDV-MCNC: 9 MG/DL (ref 6–20)
BUN/CREAT BLD: ABNORMAL (ref 9–20)
CALCIUM SERPL-MCNC: 8.3 MG/DL (ref 8.6–10.4)
CHLORIDE BLD-SCNC: 104 MMOL/L (ref 98–107)
CO2: 23 MMOL/L (ref 20–31)
CREAT SERPL-MCNC: 0.42 MG/DL (ref 0.5–0.9)
GFR AFRICAN AMERICAN: >60 ML/MIN
GFR NON-AFRICAN AMERICAN: >60 ML/MIN
GFR SERPL CREATININE-BSD FRML MDRD: ABNORMAL ML/MIN/{1.73_M2}
GFR SERPL CREATININE-BSD FRML MDRD: ABNORMAL ML/MIN/{1.73_M2}
GLUCOSE BLD-MCNC: 99 MG/DL (ref 70–99)
HCT VFR BLD CALC: 40.6 % (ref 36.3–47.1)
HEMOGLOBIN: 12.6 G/DL (ref 11.9–15.1)
MCH RBC QN AUTO: 29.2 PG (ref 25.2–33.5)
MCHC RBC AUTO-ENTMCNC: 31 G/DL (ref 28.4–34.8)
MCV RBC AUTO: 94 FL (ref 82.6–102.9)
NRBC AUTOMATED: 0 PER 100 WBC
PDW BLD-RTO: 12.6 % (ref 11.8–14.4)
PLATELET # BLD: 259 K/UL (ref 138–453)
PMV BLD AUTO: 11.4 FL (ref 8.1–13.5)
POTASSIUM SERPL-SCNC: 4.9 MMOL/L (ref 3.7–5.3)
RBC # BLD: 4.32 M/UL (ref 3.95–5.11)
SODIUM BLD-SCNC: 139 MMOL/L (ref 135–144)
SURGICAL PATHOLOGY REPORT: NORMAL
WBC # BLD: 8.6 K/UL (ref 3.5–11.3)

## 2020-09-09 PROCEDURE — 2580000003 HC RX 258: Performed by: SURGERY

## 2020-09-09 PROCEDURE — 6360000002 HC RX W HCPCS: Performed by: SURGERY

## 2020-09-09 PROCEDURE — 94761 N-INVAS EAR/PLS OXIMETRY MLT: CPT

## 2020-09-09 PROCEDURE — 2700000000 HC OXYGEN THERAPY PER DAY

## 2020-09-09 PROCEDURE — 2500000003 HC RX 250 WO HCPCS: Performed by: SURGERY

## 2020-09-09 PROCEDURE — G0378 HOSPITAL OBSERVATION PER HR: HCPCS

## 2020-09-09 PROCEDURE — 36415 COLL VENOUS BLD VENIPUNCTURE: CPT

## 2020-09-09 PROCEDURE — 6370000000 HC RX 637 (ALT 250 FOR IP): Performed by: SURGERY

## 2020-09-09 PROCEDURE — 6360000004 HC RX CONTRAST MEDICATION: Performed by: SURGERY

## 2020-09-09 PROCEDURE — 94640 AIRWAY INHALATION TREATMENT: CPT

## 2020-09-09 PROCEDURE — 74220 X-RAY XM ESOPHAGUS 1CNTRST: CPT

## 2020-09-09 PROCEDURE — 85027 COMPLETE CBC AUTOMATED: CPT

## 2020-09-09 PROCEDURE — 80048 BASIC METABOLIC PNL TOTAL CA: CPT

## 2020-09-09 RX ADMIN — HYDROMORPHONE HYDROCHLORIDE 1 MG: 1 INJECTION, SOLUTION INTRAMUSCULAR; INTRAVENOUS; SUBCUTANEOUS at 02:35

## 2020-09-09 RX ADMIN — IPRATROPIUM BROMIDE AND ALBUTEROL SULFATE 1 AMPULE: .5; 3 SOLUTION RESPIRATORY (INHALATION) at 15:02

## 2020-09-09 RX ADMIN — HEPARIN SODIUM 5000 UNITS: 5000 INJECTION INTRAVENOUS; SUBCUTANEOUS at 06:43

## 2020-09-09 RX ADMIN — DEXTROSE MONOHYDRATE 3 G: 50 INJECTION, SOLUTION INTRAVENOUS at 02:36

## 2020-09-09 RX ADMIN — FAMOTIDINE 20 MG: 10 INJECTION INTRAVENOUS at 08:46

## 2020-09-09 RX ADMIN — IPRATROPIUM BROMIDE AND ALBUTEROL SULFATE 1 AMPULE: .5; 3 SOLUTION RESPIRATORY (INHALATION) at 08:33

## 2020-09-09 RX ADMIN — ONDANSETRON 4 MG: 2 INJECTION INTRAMUSCULAR; INTRAVENOUS at 06:48

## 2020-09-09 RX ADMIN — ONDANSETRON 4 MG: 2 INJECTION INTRAMUSCULAR; INTRAVENOUS at 02:44

## 2020-09-09 RX ADMIN — Medication 10 ML: at 08:46

## 2020-09-09 RX ADMIN — OXYCODONE HYDROCHLORIDE AND ACETAMINOPHEN 2 TABLET: 5; 325 TABLET ORAL at 10:32

## 2020-09-09 RX ADMIN — IOHEXOL 30 ML: 240 INJECTION, SOLUTION INTRATHECAL; INTRAVASCULAR; INTRAVENOUS; ORAL at 09:07

## 2020-09-09 RX ADMIN — HEPARIN SODIUM 5000 UNITS: 5000 INJECTION INTRAVENOUS; SUBCUTANEOUS at 14:32

## 2020-09-09 RX ADMIN — FLUCONAZOLE 150 MG: 100 TABLET ORAL at 15:12

## 2020-09-09 ASSESSMENT — PAIN SCALES - GENERAL
PAINLEVEL_OUTOF10: 7
PAINLEVEL_OUTOF10: 9
PAINLEVEL_OUTOF10: 5

## 2020-09-09 NOTE — TELEPHONE ENCOUNTER
Pt left voicemail at office stating that she just got home from the hospital and noted that her temperature was 99.9 so she wanted to report that. Also, she wanted to ask if she could use TUMS. I returned her call and left a detailed voicemail. 1. Asked that she continue to monitor her temperature  2. May use TUMS  3.  Call back as needed

## 2020-09-09 NOTE — PROGRESS NOTES
[id number]    Timeout for rectus sheath block. Patient was sedated versed 2 mg iv et fentanyl 50 mcg iv. Block was completed per Dr Keanu Castillo with marcaine 0.5% 40 ml, 20 ml per side. Vital signs were stable. Tolerated well. Resting quietly with out complaints.
CLINICAL PHARMACY NOTE: MEDS TO [address] Select Patient?: No  Total # of Prescriptions Filled: 3   The following medications were delivered to the patient:  · Enoxaparin  · Percocet  · promethazine  Total # of Interventions Completed: 0  Time Spent (min): 0    Additional Documentation:
CLINICAL PHARMACY NOTE: MEDS TO [address] Select Patient?: No  Total # of Prescriptions Filled: 3   The following medications were delivered to the patient:  · Enoxaparin 40 mg / 0.4 ml syr  · Promethazine 25 mg tab  · Oxycodone- acetaminophen 5/325 mg tab  Total # of Interventions Completed: 1  Time Spent (min): 45    Additional Documentation:Medications delivered to the patient in her room (244).
Patient and RN gave heparin injection, patient stated to RN that she understands how to give injection and will be able to give self injections at home.
Updated cc on chart
deep breathing     Thank you,    Electronically signed by Roque Schulz DO  on 9/9/2020 at 7:23 AM

## 2020-09-10 NOTE — DISCHARGE SUMMARY
Bariatric Surgery Discharge Summary    Admit Date:  9/8/2020    Discharge date:   9/9/2020  3:28 PM       DISCHARGE DIAGNOSES:   1. Morbid obesity. 2. Status post robotic sleeve gastrectomy    PRESENTING HISTORY:   The patient is a 64 y.o. female with a longstanding history of morbid   Obesity. She was found to be a good candidate for a robotic sleeve, so   after the proper preoperative process was completed she was brought into the   hospital to undergo this procedure. CONSULTATIONS OBTAINED:   None. HOSPITAL COURSE:   The patient was taken to the operating room on 9/8/2020. She underwent a robotic sleeve which went without difficulty. She was   transferred to the bariatric unit postoperatively. Her postoperative   course was uneventful. By postoperative day number 1 she was tolerating a diet, and ambulating. Her pain was controlled and she was felt to be okay   for discharge. DISCHARGE INSTRUCTIONS:    She will follow up with Dr. Alvin Waldron in 1 weeks. Follow the instructions as   outlined in your bariatric surgery patient binder. DISCHARGE MEDICATIONS:   She was given prescriptions for phenergan and for percocet. CONDITION ON DISCHARGE:   Satisfactory.     To Home     Electronically signed by Heide Hickman DO on 9/10/2020 at 12:28 PM

## 2020-09-10 NOTE — ANESTHESIA POSTPROCEDURE EVALUATION
Department of Anesthesiology  Postprocedure Note    Patient: Tam Haley  MRN: [id number]  YOB: 1960  Date of evaluation: 9/9/2020  Time:  10:13 PM     Procedure Summary     Date:  09/08/20 Room / Location:  [address]    Anesthesia Start:  Polina Koroma Anesthesia Stop:  5833    Procedure:  [address], EGD (N/A ) Diagnosis:  (MORBID OBESITY, SITUS INVERUS, PRE-DIABETES)    Surgeon:  Sarwat Gunn DO Responsible Provider:  Navarro Alvarado MD    Anesthesia Type:  Not recorded ASA Status:  Not recorded          Anesthesia Type: No value filed. Taran Phase I: Taran Score: 8    Taran Phase II:      Last vitals: Reviewed and per EMR flowsheets.        Anesthesia Post Evaluation    Patient location during evaluation: PACU  Patient participation: complete - patient participated  Level of consciousness: awake  Pain score: 2  Airway patency: patent  Nausea & Vomiting: no nausea and no vomiting  Complications: no  Cardiovascular status: blood pressure returned to baseline  Respiratory status: acceptable  Hydration status: euvolemic

## 2020-09-10 NOTE — OP NOTE
Date of surgery: 9/8/2020     Preoperative diagnoses:   Morbid Obesity, BMI 47 kg/m2  Situs Inversus     Postoperative diagnoses:  Same     Procedure:   1) Laparoscopic Sleeve Gastrectomy - Robotic Assisted  2) Esophagogastroduodenoscopy      Surgeon:  Danis Kendall DO     Ast: DO Cammy     Complications:  None     Specimen:  Stomach     Estimated blood loss:   17 cc     Anesthesia:  General     Medications: Ancef, Heparin SQ, SCD's     Operative indications:  The patient is a pleasant 64y.o. year old female with morbid obesity and a BMI of Body mass index is 47 kg/m². Eduardo Higuera have had prior episodes of failed attempts at medical weight loss and present today for surgical weight loss. Eduardo Higuera have associated comorbidities including:  As listed above        Operative narrative:     The patient was taken to the operative suite and administered anesthesia by the anesthetic team.  Next we prepped and draped in normal sterile fashion.  Incision was then made at Mathis's point (on the right for situs inversus) for a 12 mm port.  The abdomen was surveyed and we entered the abdomen using a optical Visiport trocar of 12 mm in size.  This was accomplished at Mathis's point.  Pneumoperitoneum was then established.  We then placed 4 other ports for sleeve gastrectomy in standard fashion under direct laparoscopic visualization.  Attention was then turned towards placement of the [facility] liver retractor.  Small incision made just inferior to the xiphoid process for the liver retractor.  The liver retractor was then placed under direct laparoscopic visualization in order to facilitate visualization of the stomach and hiatus.  No visible hiatal hernia     We then turned our attention towards formation of the gastric sleeve.  Starting at 6 cm proximal to the pylorus bite dissection was undertaken of the greater curvature and the short gastric vessels taken down using the Vessel Sealer.  We mobilized this from our 6 cm staple line on the inner lumen of the stomach appeared intact and hemostatic throughout.  The scope was slowly withdrawn to the GE junction and no evidence of stricture noted.  The scope was then withdrawn from the esophagus and no other lesion noted.     Attention was then turned back towards the abdomen the abdomen was aspirated of the prior placed saline for leak test.  I then placed an anchoring suture using 3-0 Vicryl suture to prevent torsion of the sleeve and migration of the sleeve superiorly.  I then placed a 19 Western Rafia Govind drain into the abdomen and positioned this posterior to the stomach and along the staple line.  Specimen was withdrawn from the left upper quadrant incision.  We then irrigated this incision thoroughly with saline.     The drain was noted to be in proper position and again the staple line noted be hemostatic.  Next counts reported to me as correct.     The 12 mm port sites were then closed using a suture passer to pass 0 Vicryl suture under direct laparoscopic visualization for proper fascial reapproximation at each port site.  All ports were then removed and the drain secured into position using a 2-0 silk suture.  Pneumoperitoneum was released in entirety.  The skin was then closed using 4-0 Vicryl subcuticular stitches in interrupted fashion.  The region was cleaned with sterile normal saline followed by placement of TinCoBen and Steri-Strips and sterile bandage.  The drain was connected to bulb suction.  The patient tolerated the procedure well and was transferred to PACU.     The patient's family was updated postoperatively.

## 2020-09-10 NOTE — TELEPHONE ENCOUNTER
Spoke with patient starting running a fever last night, did have some chills last night, gauze has blood on it, incision and drain look okay per patient. Currently she is not taking anything for the fever .  Please advise

## 2020-09-10 NOTE — TELEPHONE ENCOUNTER
Called patient, fever goes away when she gets up takes big breaths and walks around. This sounds like her fever is probably atlectasis. Passing flatus, no nausea and tolerating clears.   No tachycardia    Plan:  IS 30 times per hour  Ambulation  Clears  Call back if still fever persistient

## 2020-09-16 ENCOUNTER — HOSPITAL ENCOUNTER (OUTPATIENT)
Dept: ONCOLOGY | Age: 60
Discharge: HOME | End: 2020-09-16
Attending: SURGERY | Admitting: SURGERY
Payer: COMMERCIAL

## 2020-09-16 ENCOUNTER — OFFICE VISIT (OUTPATIENT)
Dept: BARIATRICS/WEIGHT MGMT | Age: 60
End: 2020-09-16

## 2020-09-16 VITALS
SYSTOLIC BLOOD PRESSURE: 117 MMHG | HEART RATE: 65 BPM | OXYGEN SATURATION: 93 % | TEMPERATURE: 98.4 F | DIASTOLIC BLOOD PRESSURE: 72 MMHG | RESPIRATION RATE: 20 BRPM

## 2020-09-16 VITALS
HEIGHT: 66 IN | BODY MASS INDEX: 45.48 KG/M2 | DIASTOLIC BLOOD PRESSURE: 78 MMHG | TEMPERATURE: 97.2 F | HEART RATE: 68 BPM | WEIGHT: 283 LBS | SYSTOLIC BLOOD PRESSURE: 106 MMHG

## 2020-09-16 PROBLEM — E86.0 DEHYDRATION: Status: ACTIVE | Noted: 2020-09-16

## 2020-09-16 PROCEDURE — 96360 HYDRATION IV INFUSION INIT: CPT

## 2020-09-16 PROCEDURE — 99024 POSTOP FOLLOW-UP VISIT: CPT | Performed by: SURGERY

## 2020-09-16 PROCEDURE — 2580000003 HC RX 258: Performed by: SURGERY

## 2020-09-16 PROCEDURE — 96361 HYDRATE IV INFUSION ADD-ON: CPT

## 2020-09-16 RX ORDER — 0.9 % SODIUM CHLORIDE 0.9 %
1000 INTRAVENOUS SOLUTION INTRAVENOUS ONCE
Status: COMPLETED | OUTPATIENT
Start: 2020-09-16 | End: 2020-09-16

## 2020-09-16 RX ADMIN — SODIUM CHLORIDE 1000 ML: 9 INJECTION, SOLUTION INTRAVENOUS at 16:11

## 2020-09-16 RX ADMIN — SODIUM CHLORIDE 1000 ML: 9 INJECTION, SOLUTION INTRAVENOUS at 17:14

## 2020-09-16 NOTE — PROGRESS NOTES
Medical Nutrition Therapy  Metabolic and Bariatric surgery  1 week Post operative follow up         Pt reports:         Vitals:   Vitals:    09/16/20 1328   BP: 106/78   Pulse: 68   Temp: 97.2 °F (36.2 °C)   Weight: 283 lb (128.4 kg)   Height: 5' 5.5\" (1.664 m)      Body mass index is 46.38 kg/m². Labs reviewed:              Nutrition Assessment:   PES: Inadequate food and beverage intake r/t WLS as evidenced by loss of excess body weight lost 21 lbs over 1 week.       Goals   60-80gm of protein  48-64oz of fluid       [x] met     []  Not met        Plan:  Pt questions answered re diet advancement;  F/u 5 weeks post-op      Sokaogon Bryant

## 2020-09-16 NOTE — LETTER
Visit Date: 9/16/2020    Patient: Zack Lei  YOB: 1960    Dear Dr. Crow Sinclair,           As you know we are treating your patient for obesity (as indicated by elevated  BMI of 30 kg/(m^2) or greater)  with a sleeve gastrectomy. Claudette Moe had surgery on September 9, 2020. She was discharged home uneventfully. Her current Weight: 283 lb (128.4 kg),  her  Body mass index is 46.38 kg/m². Over the next year the patient will be scheduled with our team at  1 month, 3 months, 6 months, 9 months, 1 year, and annually as well. At each visit we will review nutrition related labs, weight, vitamin/mineral intake, and overall health. Claudette Moe will have the opportunity to partner with the dietitian to continue to review health related goals. The team here would very much appreciate your assistance in encouraging your patient to attend these very important appointments. As our patients begin to heal from surgery, lose weight, and experience improved quality of life there will inevitably be struggles due to the chronic and relapsing nature of obesity. We offer closer follow up as needed, support groups and a  to support them. Thank you for allowing me to participate in the care of your patient. If you have any questions or concerns, please do not hesitate to call.       Sincerely,   Dr. Julinane Montenegro, DO  [facility]  [address] [place] [address]  O: [phone]  F: [phone]

## 2020-09-27 NOTE — PROGRESS NOTES
[facility]  [facility]  [address] [place] [address] [place]  [address] [place] [id number]  Dept: [phone]    SURGICAL WEIGHT LOSS MANAGEMENT PROGRAM  PROGRESS NOTE     CC: Weight Loss    Patient: Emiliano Burgess      Service Date: 9/16/2020  Visit:   1 week    Medical Record #: [id number]  Date of Surgery:   9/9/2020    Reason for Visit: Routine Post-Operative:  [] New Problem /   [] FU of existing problem    Patient here for 1 week visit after sleeve gastrectomy. Doing well. Some dehydration. No nausea, vomiting, fevers/chills. No chest pain or shortness of breath. Ambulating and voiding. Having stools. Height: 5' 5.5\" (1.664 m)  Highest Weight:   305 lbs    Current Visit Weight Information  Weight: 283 lb (128.4 kg)   BMI: Body mass index is 46.38 kg/m². Weight loss since surgery:     22     [Labs to be drawn prior to 1m, 3m, 6m, 12m, 18m, and annual visits]    Liver pathology:    [] NA    [] No Gross path    [] Liver Steatosis   [] Discussed w/ pt   [] Referred to GI    Exercising?    [] Yes    [] No   Type: walking Frequency: daily    Review of Systems:     General  Negative for: [] Weight Change   [x] Fatigue   [x] Fevers & Chills    [] Appetite change [] Other:    Positive for: [] Weight Change   [] Fatigue   [] Fevers & Chills    [] Appetite change [] Other:   Cardiac  Negative for: [x] Chest Pain   [x] Difficulty Breathing   [] Leg Cramps [x] Edema of Lower Extremeties    [] Left   [] Right      Positive for: [] Chest Pain   [] Difficulty Breathing   [] Leg Cramps [] Edema of Lower Extremeties    [] Left   [] Right   Pulmonary  Negative for: [x] Shortness of Breath [] Wheeze [x] Cough  [] Calf Pain     Positive for: [] Shortness of Breath [] Wheeze [] Cough  [] Calf Pain   Gastro-Intestinal Negative for: [] Heartburn   [] Reflux   [] Dysphagia   [] Melena   [] BRBPR  [x] Vomiting   [x] Abdominal Pain   [] Diarrhea     [x] Constipation  [] Other:     Positive for: [] Heartburn   [] Reflux   [] Dysphagia   [] Melena   [] BRBPR  [] Vomiting   [] Abdominal Pain   [] Diarrhea     [] Constipation  [] Other:    Muskuloskeletal Negative for: [] Joint pain   [] Back pain   [] Knee Pain   [x] Muscle weakness [x] Hernia   [] Edema [] Other:     Positive for: [] Joint pain   [] Back pain   [] Knee Pain   [] Muscle weakness [] Hernia   [] Edema [] Other:    Neurologic Negative for: [x] Syncope   [x] Insomnia   [] Being treated for depression  [] Other:     Positive for: [] Syncope   [] Insomnia   [] Being treated for depression  [] Other:    Skin Negative for: [x] Wound:   [] Open   [] Draining   [] Incisional     [x] Rash   [] Hair Loss  [] Other:     Positive for: [] Wound:   [] Open   [] Draining    [] Incisional  [] Rash   [] Hair Loss  [] Other:          Physical Assessment:     /78   Pulse 68   Temp 97.2 °F (36.2 °C)   Ht 5' 5.5\" (1.664 m)   Wt 283 lb (128.4 kg)   LMP 06/17/2011   BMI 46.38 kg/m²   General Negative for:  [x] Lapses in memory   [x] Unusual Stress   [x] Diffic Concen [] Unable to sleep   [] Eating in response to stress   [] Other:    Positive for:  [] Lapses in memory   [] Unusual Stress   [] Diffic Concen [] Unable to sleep   [] Eating in response to stress   [] Other:   Cardio-Pulmonary   [x] Heart RRR   [x] No murmurs   [] Pulses nl x4 extrem    [x] Good inspiratory effort   [x] No wheezing     [x] Lungs clear to auscultation bilaterally    [] Other:    Gastro-Intestinal   [x] Abd S/NT/ND/Benign   [x] No abdominal mass/hernia    [x] No Splenomegaly    [] Other:    Muskuloskeletal   [x] Good muscle strength x4 extremities   [x] nl gait and ambul    [x] Nl ROM x4 extremities    [] Other:    Neurologic   [x] Alert and oriented x3    [] Other:   Skin   [x] Intact w/ no open wounds   [x] Incisions C/D/I    [] Steri strips removed   [x] No drainage or Infection    [] Other:      Assessment & Plan:      1.  S/P laparoscopic sleeve gastrectomy          [x] Advance Diet    [x] Daily protein (65-75gm/day)   [x] Take Vitamins   [x] Attend Support Group    [x] Exercise Regularly     PPI    IV fluids    Ambulation    Lovenox    Overall doing well    JESICA removed    Follow up in 1 month  Follow up: No follow-ups on file. Orders placed this encounter:   No orders of the defined types were placed in this encounter. New Prescriptions:   No orders of the defined types were placed in this encounter. Electronically signed by Ministerio Mcfarland DO on 9/27/2020 at 11:55 AM    Please note that this chart was generated using voice recognition Dragon dictation software. Although every effort was made to ensure the accuracy of this automated transcription, some errors in transcription may have occurred.

## 2020-10-06 ENCOUNTER — TELEPHONE (OUTPATIENT)
Dept: BARIATRICS/WEIGHT MGMT | Age: 60
End: 2020-10-06

## 2020-10-06 NOTE — TELEPHONE ENCOUNTER
Next Visit Date:  10/16/2020    Patient Surgery Date  9/9/20    Type of  Surgery  sleeve    Patient calls complaining of : patient would like to know if it would be ok for her to resume taking a supplement-it is a collagen peptide that she was putting into her tea prior to surgery. She was taking it for her hair/skin/nails. Patient advised: You will receive a call back from the office within 24-48 hours.     Is it ok to leave a message if we call back yes

## 2020-10-16 ENCOUNTER — TELEPHONE (OUTPATIENT)
Dept: BARIATRICS/WEIGHT MGMT | Age: 60
End: 2020-10-16

## 2020-10-16 ENCOUNTER — OFFICE VISIT (OUTPATIENT)
Dept: BARIATRICS/WEIGHT MGMT | Age: 60
End: 2020-10-16

## 2020-10-16 VITALS
TEMPERATURE: 97 F | HEART RATE: 64 BPM | WEIGHT: 266 LBS | BODY MASS INDEX: 42.75 KG/M2 | SYSTOLIC BLOOD PRESSURE: 110 MMHG | HEIGHT: 66 IN | DIASTOLIC BLOOD PRESSURE: 68 MMHG

## 2020-10-16 PROBLEM — E86.0 DEHYDRATION: Status: RESOLVED | Noted: 2020-09-16 | Resolved: 2020-10-16

## 2020-10-16 PROCEDURE — 99024 POSTOP FOLLOW-UP VISIT: CPT | Performed by: SURGERY

## 2020-10-16 NOTE — TELEPHONE ENCOUNTER
Patient requesting a letter stating that her first two day back to work she can only work half days.  Advised patient she will hear from office no later than end of Day Monday 10/19/2020

## 2020-10-16 NOTE — PROGRESS NOTES
Medical Nutrition Therapy  Metabolic and Bariatric surgery  1 month after surgery follow up note         Pt reports:         Vitals:   Vitals:    10/16/20 1521   BP: 110/68   Pulse: 64   Temp: 97 °F (36.1 °C)   Weight: 266 lb (120.7 kg)   Height: 5' 5.5\" (1.664 m)      Body mass index is 43.59 kg/m². Labs reviewed:     Multivitamin/mineral intake:Flinstones MVI daily   Calcium intake:   1-2 Tums daily   Other:            Nutrition Assessment:   PES: Inadequate food and beverage intake r/t WLS as evidenced by loss of excess body weight lost 17 lbs over 4 weeks.       Goals   60-80gm of protein  48-64oz of fluid     [x] met     []  Not met        Plan:  Questions answered re diet advancement and tolerance  F/u 3 months after surgery         Yadiel Latif

## 2020-10-18 NOTE — PROGRESS NOTES
[facility]  [facility]  [address] [place] [address] [place]  [address] [place] [id number]  Dept: [phone]    SURGICAL WEIGHT LOSS MANAGEMENT PROGRAM  PROGRESS NOTE     CC: Weight Loss    Patient: Jackeline Saini      Service Date: 10/16/2020  Visit:   1 month   Medical Record #: [id number]  Date of Surgery:   9/9/2020    Reason for Visit: Routine Post-Operative:  [] New Problem /   [] FU of existing problem    Patient here for 1 month visit after sleeve gastrectomy. Doing well. Some dehydration. No nausea, vomiting, fevers/chills. No chest pain or shortness of breath. Ambulating and voiding. Having stools. Height: 5' 5.5\" (1.664 m)  Highest Weight:   305 lbs    Current Visit Weight Information  Weight: 266 lb (120.7 kg)   BMI: Body mass index is 43.59 kg/m². Weight loss since surgery:     39     [Labs to be drawn prior to 1m, 3m, 6m, 12m, 18m, and annual visits]    Liver pathology:    [] NA    [] No Gross path    [] Liver Steatosis   [] Discussed w/ pt   [] Referred to GI    Exercising?    [] Yes    [] No   Type: walking Frequency: daily    Review of Systems:     General  Negative for: [] Weight Change   [x] Fatigue   [x] Fevers & Chills    [] Appetite change [] Other:    Positive for: [] Weight Change   [] Fatigue   [] Fevers & Chills    [] Appetite change [] Other:   Cardiac  Negative for: [x] Chest Pain   [x] Difficulty Breathing   [] Leg Cramps [x] Edema of Lower Extremeties    [] Left   [] Right      Positive for: [] Chest Pain   [] Difficulty Breathing   [] Leg Cramps [] Edema of Lower Extremeties    [] Left   [] Right   Pulmonary  Negative for: [x] Shortness of Breath [] Wheeze [x] Cough  [] Calf Pain     Positive for: [] Shortness of Breath [] Wheeze [] Cough  [] Calf Pain   Gastro-Intestinal Negative for: [] Heartburn   [] Reflux   [] Dysphagia   [] Melena   [] BRBPR  [x] Vomiting   [x] Abdominal Pain   [] Diarrhea     [x] Constipation  [] Other:     Positive for: [] Heartburn   [] Reflux   [] Dysphagia   [] Melena   [] BRBPR  [] Vomiting   [] Abdominal Pain   [] Diarrhea     [] Constipation  [] Other:    Muskuloskeletal Negative for: [] Joint pain   [] Back pain   [] Knee Pain   [x] Muscle weakness [x] Hernia   [] Edema [] Other:     Positive for: [] Joint pain   [] Back pain   [] Knee Pain   [] Muscle weakness [] Hernia   [] Edema [] Other:    Neurologic Negative for: [x] Syncope   [x] Insomnia   [] Being treated for depression  [] Other:     Positive for: [] Syncope   [] Insomnia   [] Being treated for depression  [] Other:    Skin Negative for: [x] Wound:   [] Open   [] Draining   [] Incisional     [x] Rash   [] Hair Loss  [] Other:     Positive for: [] Wound:   [] Open   [] Draining    [] Incisional  [] Rash   [] Hair Loss  [] Other:          Physical Assessment:     /68   Pulse 64   Temp 97 °F (36.1 °C)   Ht 5' 5.5\" (1.664 m)   Wt 266 lb (120.7 kg)   LMP 06/17/2011   BMI 43.59 kg/m²   General Negative for:  [x] Lapses in memory   [x] Unusual Stress   [x] Diffic Concen [] Unable to sleep   [] Eating in response to stress   [] Other:    Positive for:  [] Lapses in memory   [] Unusual Stress   [] Diffic Concen [] Unable to sleep   [] Eating in response to stress   [] Other:   Cardio-Pulmonary   [x] Heart RRR   [x] No murmurs   [] Pulses nl x4 extrem    [x] Good inspiratory effort   [x] No wheezing     [x] Lungs clear to auscultation bilaterally    [] Other:    Gastro-Intestinal   [x] Abd S/NT/ND/Benign   [x] No abdominal mass/hernia    [x] No Splenomegaly    [] Other:    Muskuloskeletal   [x] Good muscle strength x4 extremities   [x] nl gait and ambul    [x] Nl ROM x4 extremities    [] Other:    Neurologic   [x] Alert and oriented x3    [] Other:   Skin   [x] Intact w/ no open wounds   [x] Incisions C/D/I    [] Steri strips removed   [x] No drainage or Infection    [] Other:      Assessment & Plan:      1.  S/P laparoscopic sleeve gastrectomy          [x] Advance Diet    [x] Daily protein (65-75gm/day)   [x] Take Vitamins   [x] Attend Support Group    [x] Exercise Regularly     PPI    Linzess for constipation    Overall doing well    Exercise    Follow up in 2 month  Follow up: No follow-ups on file. Orders placed this encounter:   Orders Placed This Encounter   Procedures    CBC Auto Differential    Comprehensive Metabolic Panel    Ferritin    Hemoglobin A1C    Iron and TIBC    Vitamin A    TSH without Reflex    T4, Free    PTH, Intact    Magnesium    Vitamin B1, Whole Blood    Vitamin B12    Vitamin D 25 Hydroxy    Zinc       New Prescriptions:   Orders Placed This Encounter   Medications    linaCLOtide (LINZESS) 72 MCG CAPS capsule     Sig: Take 1 capsule by mouth every morning (before breakfast)     Dispense:  30 capsule     Refill:  0        Electronically signed by Clover Remy DO on 10/18/2020 at 10:06 AM    Please note that this chart was generated using voice recognition Dragon dictation software. Although every effort was made to ensure the accuracy of this automated transcription, some errors in transcription may have occurred.

## 2020-11-11 ENCOUNTER — NURSE ONLY (OUTPATIENT)
Dept: BARIATRICS/WEIGHT MGMT | Age: 60
End: 2020-11-11

## 2020-11-11 VITALS — TEMPERATURE: 95.7 F

## 2020-12-09 ENCOUNTER — NURSE ONLY (OUTPATIENT)
Dept: BARIATRICS/WEIGHT MGMT | Age: 60
End: 2020-12-09

## 2020-12-10 ENCOUNTER — HOSPITAL ENCOUNTER (OUTPATIENT)
Age: 60
Discharge: HOME | End: 2020-12-10
Payer: COMMERCIAL

## 2020-12-10 LAB
ABSOLUTE EOS #: 0.13 K/UL (ref 0–0.44)
ABSOLUTE IMMATURE GRANULOCYTE: <0.03 K/UL (ref 0–0.3)
ABSOLUTE LYMPH #: 2.8 K/UL (ref 1.1–3.7)
ABSOLUTE MONO #: 0.43 K/UL (ref 0.1–1.2)
ALBUMIN SERPL-MCNC: 3.4 G/DL (ref 3.5–5.2)
ALBUMIN/GLOBULIN RATIO: 1 (ref 1–2.5)
ALP BLD-CCNC: 56 U/L (ref 35–104)
ALT SERPL-CCNC: 7 U/L (ref 5–33)
ANION GAP SERPL CALCULATED.3IONS-SCNC: 11 MMOL/L (ref 9–17)
AST SERPL-CCNC: 18 U/L
BASOPHILS # BLD: 1 % (ref 0–2)
BASOPHILS ABSOLUTE: 0.06 K/UL (ref 0–0.2)
BILIRUB SERPL-MCNC: 0.46 MG/DL (ref 0.3–1.2)
BUN BLDV-MCNC: 8 MG/DL (ref 6–20)
BUN/CREAT BLD: ABNORMAL (ref 9–20)
CALCIUM SERPL-MCNC: 9.3 MG/DL (ref 8.6–10.4)
CHLORIDE BLD-SCNC: 105 MMOL/L (ref 98–107)
CO2: 25 MMOL/L (ref 20–31)
CREAT SERPL-MCNC: 0.47 MG/DL (ref 0.5–0.9)
DIFFERENTIAL TYPE: ABNORMAL
EOSINOPHILS RELATIVE PERCENT: 2 % (ref 1–4)
ESTIMATED AVERAGE GLUCOSE: 120 MG/DL
FERRITIN: 431 UG/L (ref 13–150)
GFR AFRICAN AMERICAN: >60 ML/MIN
GFR NON-AFRICAN AMERICAN: >60 ML/MIN
GFR SERPL CREATININE-BSD FRML MDRD: ABNORMAL ML/MIN/{1.73_M2}
GFR SERPL CREATININE-BSD FRML MDRD: ABNORMAL ML/MIN/{1.73_M2}
GLUCOSE BLD-MCNC: 94 MG/DL (ref 70–99)
HBA1C MFR BLD: 5.8 % (ref 4–6)
HCT VFR BLD CALC: 40.4 % (ref 36.3–47.1)
HEMOGLOBIN: 12.7 G/DL (ref 11.9–15.1)
IMMATURE GRANULOCYTES: 0 %
IRON SATURATION: 30 % (ref 20–55)
IRON: 55 UG/DL (ref 37–145)
LYMPHOCYTES # BLD: 48 % (ref 24–43)
MAGNESIUM: 1.8 MG/DL (ref 1.6–2.6)
MCH RBC QN AUTO: 28.8 PG (ref 25.2–33.5)
MCHC RBC AUTO-ENTMCNC: 31.4 G/DL (ref 28.4–34.8)
MCV RBC AUTO: 91.6 FL (ref 82.6–102.9)
MONOCYTES # BLD: 7 % (ref 3–12)
NRBC AUTOMATED: 0 PER 100 WBC
PDW BLD-RTO: 15.2 % (ref 11.8–14.4)
PLATELET # BLD: 223 K/UL (ref 138–453)
PLATELET ESTIMATE: ABNORMAL
PMV BLD AUTO: 12.7 FL (ref 8.1–13.5)
POTASSIUM SERPL-SCNC: 3.7 MMOL/L (ref 3.7–5.3)
PTH INTACT: 48.07 PG/ML (ref 15–65)
RBC # BLD: 4.41 M/UL (ref 3.95–5.11)
RBC # BLD: ABNORMAL 10*6/UL
SEG NEUTROPHILS: 42 % (ref 36–65)
SEGMENTED NEUTROPHILS ABSOLUTE COUNT: 2.44 K/UL (ref 1.5–8.1)
SODIUM BLD-SCNC: 141 MMOL/L (ref 135–144)
THYROXINE, FREE: 1.33 NG/DL (ref 0.93–1.7)
TOTAL IRON BINDING CAPACITY: 184 UG/DL (ref 250–450)
TOTAL PROTEIN: 6.7 G/DL (ref 6.4–8.3)
TSH SERPL DL<=0.05 MIU/L-ACNC: 2.69 MIU/L (ref 0.3–5)
UNSATURATED IRON BINDING CAPACITY: 129 UG/DL (ref 112–347)
VITAMIN B-12: 734 PG/ML (ref 232–1245)
VITAMIN D 25-HYDROXY: 26 NG/ML (ref 30–100)
WBC # BLD: 5.9 K/UL (ref 3.5–11.3)
WBC # BLD: ABNORMAL 10*3/UL

## 2020-12-10 PROCEDURE — 83735 ASSAY OF MAGNESIUM: CPT

## 2020-12-10 PROCEDURE — 36415 COLL VENOUS BLD VENIPUNCTURE: CPT

## 2020-12-10 PROCEDURE — 83540 ASSAY OF IRON: CPT

## 2020-12-10 PROCEDURE — 84425 ASSAY OF VITAMIN B-1: CPT

## 2020-12-10 PROCEDURE — 84590 ASSAY OF VITAMIN A: CPT

## 2020-12-10 PROCEDURE — 80053 COMPREHEN METABOLIC PANEL: CPT

## 2020-12-10 PROCEDURE — 82306 VITAMIN D 25 HYDROXY: CPT

## 2020-12-10 PROCEDURE — 82607 VITAMIN B-12: CPT

## 2020-12-10 PROCEDURE — 83550 IRON BINDING TEST: CPT

## 2020-12-10 PROCEDURE — 82728 ASSAY OF FERRITIN: CPT

## 2020-12-10 PROCEDURE — 85025 COMPLETE CBC W/AUTO DIFF WBC: CPT

## 2020-12-10 PROCEDURE — 83036 HEMOGLOBIN GLYCOSYLATED A1C: CPT

## 2020-12-10 PROCEDURE — 84439 ASSAY OF FREE THYROXINE: CPT

## 2020-12-10 PROCEDURE — 83970 ASSAY OF PARATHORMONE: CPT

## 2020-12-10 PROCEDURE — 84443 ASSAY THYROID STIM HORMONE: CPT

## 2020-12-10 PROCEDURE — 84630 ASSAY OF ZINC: CPT

## 2020-12-12 LAB — ZINC: 83.5 UG/DL (ref 60–120)

## 2020-12-14 ENCOUNTER — OFFICE VISIT (OUTPATIENT)
Dept: BARIATRICS/WEIGHT MGMT | Age: 60
End: 2020-12-14
Payer: COMMERCIAL

## 2020-12-14 VITALS
SYSTOLIC BLOOD PRESSURE: 98 MMHG | RESPIRATION RATE: 16 BRPM | BODY MASS INDEX: 38.57 KG/M2 | HEIGHT: 66 IN | TEMPERATURE: 96.7 F | HEART RATE: 68 BPM | WEIGHT: 240 LBS | DIASTOLIC BLOOD PRESSURE: 60 MMHG

## 2020-12-14 PROBLEM — E66.01 OBESITY, CLASS III, BMI 40-49.9 (MORBID OBESITY) (HCC): Status: RESOLVED | Noted: 2019-12-04 | Resolved: 2020-12-14

## 2020-12-14 PROBLEM — E66.813 OBESITY, CLASS III, BMI 40-49.9 (MORBID OBESITY) (HCC): Status: RESOLVED | Noted: 2019-12-04 | Resolved: 2020-12-14

## 2020-12-14 PROBLEM — E66.9 OBESITY (BMI 30-39.9): Status: ACTIVE | Noted: 2020-12-14

## 2020-12-14 LAB
RETINYL PALMITATE: <0.02 MG/L (ref 0–0.1)
VITAMIN A LEVEL: 0.24 MG/L (ref 0.3–1.2)
VITAMIN A, INTERP: ABNORMAL

## 2020-12-14 PROCEDURE — 99213 OFFICE O/P EST LOW 20 MIN: CPT | Performed by: NURSE PRACTITIONER

## 2020-12-14 RX ORDER — ERGOCALCIFEROL 1.25 MG/1
50000 CAPSULE ORAL WEEKLY
Qty: 8 CAPSULE | Refills: 0 | Status: SHIPPED | OUTPATIENT
Start: 2020-12-14 | End: 2021-06-15

## 2020-12-14 NOTE — PROGRESS NOTES
Post-op Bariatric Surgery Note    Subjective     Patient is 3 months s/p laparoscopic sleeve gastrectomy, down 64 lbs. Overall, doing well. Incisions well healed. Consistent use of MVI and calcium. Physical activity includes walking. No pain or other specific problems or concerns. Allergies:  No Known Allergies    Past Medical History:     Past Medical History:   Diagnosis Date    Bronchiectasis (HCC)     Chronic cough     Chronic nasal congestion     d/t kartaganer syndrome    Complete situs inversus with dextrocardia     Dextrocardia     Dr. Joyce Wheeler Hearing loss     no hearing aid at this time    HLD (hyperlipidemia)     Kartagener syndrome age 24    diagnosed age 24; bronchiectasis, chronic sinus congestion, situs inversus    Obesity     Osteoarthritis     Snores     denies apnea    Wears prescription eyeglasses     Wellness examination     SUSIE Davies-PCP [facility]   .     Past Surgical History:  Past Surgical History:   Procedure Laterality Date    COLONOSCOPY      FOOT SURGERY      rt foot (hardware remains)    INNER EAR SURGERY      rt / reconstruction    KNEE SURGERY      bilateral    NASAL SEPTUM SURGERY      SLEEVE GASTRECTOMY  09/08/2020    LAPAROSCOPIC XI ROBOTIC GASTRECTOMY SLEEVE, LIVER BIOPSY, EGD     SLEEVE GASTRECTOMY N/A 9/8/2020    LAPAROSCOPIC XI ROBOTIC GASTRECTOMY SLEEVE, EGD performed by Elis Simmons DO at [address]      adenoids    UPPER GASTROINTESTINAL ENDOSCOPY N/A 6/17/2020    EGD BIOPSY performed by Elis Simmons DO at [facility] [address]       Family History:  Family History   Problem Relation Age of Onset    Cancer Mother         colon, liver, lungs    Diabetes Maternal Grandmother     Other Father         dementia    Diabetes Father     Diabetes Paternal Grandmother        Social History:  Social History     Socioeconomic History    Marital status: Single     Spouse name: Not on file    Number of children: Not on file    Years of education: Not on file    Highest education level: Not on file   Occupational History    Not on file   Social Needs    Financial resource strain: Not on file    Food insecurity     Worry: Not on file     Inability: Not on file    Transportation needs     Medical: Not on file     Non-medical: Not on file   Tobacco Use    Smoking status: Former Smoker     Quit date: 1993     Years since quittin.9    Smokeless tobacco: Never Used   Substance and Sexual Activity    Alcohol use: Yes     Comment: social    Drug use: No    Sexual activity: Not on file   Lifestyle    Physical activity     Days per week: Not on file     Minutes per session: Not on file    Stress: Not on file   Relationships    Social connections     Talks on phone: Not on file     Gets together: Not on file     Attends Mosque service: Not on file     Active member of club or organization: Not on file     Attends meetings of clubs or organizations: Not on file     Relationship status: Not on file    Intimate partner violence     Fear of current or ex partner: Not on file     Emotionally abused: Not on file     Physically abused: Not on file     Forced sexual activity: Not on file   Other Topics Concern    Not on file   Social History Narrative    Not on file       Current Medications:  Current Outpatient Medications   Medication Sig Dispense Refill    vitamin D (ERGOCALCIFEROL) 1.25 MG (14448 UT) CAPS capsule Take 1 capsule by mouth once a week for 8 doses 8 capsule 0    Calcium Carbonate Antacid (TUMS PO) Take by mouth      Multiple Vitamins-Minerals (CELEBRATE MULTI-COMPLETE 36) CHEW Take by mouth      linaCLOtide (LINZESS) 72 MCG CAPS capsule Take 1 capsule by mouth every morning (before breakfast) 30 capsule 0     No current facility-administered medications for this visit.         Vital Signs:  BP 98/60 (Site: Left Upper Arm, Position: Sitting, Cuff Size: Large Adult)   Pulse 68   Temp 96.7 °F (35.9 °C) (Infrared) Resp 16   Ht 5' 5.5\" (1.664 m)   Wt 240 lb (108.9 kg)   LMP 06/17/2011   BMI 39.33 kg/m²     BMI/Height/Weight:  Body mass index is 39.33 kg/m². Review of Systems - A review of systems was performed. All was negative unless otherwise documented in HPI. Constitutional: Negative for fever, chills and diaphoresis. HENT: Negative for hearing loss and trouble swallowing. Eyes: Negative for photophobia and visual disturbance. Respiratory: Negative for cough, shortness of breath and wheezing. Cardiovascular: Negative for chest pain and palpitations. Gastrointestinal: Negative for nausea, vomiting, abdominal pain, diarrhea, constipation, blood in stool and abdominal distention. Endocrine: Negative for polydipsia, polyphagia and polyuria. Genitourinary: Negative for dysuria, frequency, hematuria and difficulty urinating. Musculoskeletal: Negative for myalgias, joint swelling. Skin: Negative for pallor and rash. Neurological: Negative for dizziness, tremors, light-headedness and headaches. Psychiatric/Behavioral: Negative for sleep disturbance and dysphoric mood. Objective:      Physical Exam   Vital signs reviewed. General: Well-developed and well-nourished. No acute distress. Skin: Warm, dry and intact. HEENT: Normocephalic. EOMs intact. Conjunctivae normal. Neck supple. Cardiovascular: Normal rate, regular rhythm. Pulmonary/Chest: Normal effort. Lungs clear to auscultation. No rales, rhonchi or wheezing. Abdominal: Positive bowel sounds. Soft, nontender. Nondistended. No rigidity, rebound, or guarding. Musculoskeletal: Movement x4. No edema. Neurological: Gait normal. Alert and oriented to person, place, and time. Psychiatric: Normal mood and affect. Speech and behavior normal. Judgment and thought content normal. Cognition and memory intact. Assessment:       Diagnosis Orders   1.  Arthritis  Comprehensive Metabolic Panel    TSH without Reflex    T4, Free Hemoglobin A1C    Vitamin B12 & Folate    Vitamin B1    Vitamin D 25 Hydroxy    Magnesium    Iron and TIBC    Vitamin A    PTH, Intact    CBC Auto Differential    Zinc    Ferritin   2. Situs inversus totalis  Comprehensive Metabolic Panel    TSH without Reflex    T4, Free    Hemoglobin A1C    Vitamin B12 & Folate    Vitamin B1    Vitamin D 25 Hydroxy    Magnesium    Iron and TIBC    Vitamin A    PTH, Intact    CBC Auto Differential    Zinc    Ferritin   3. Prediabetes  Comprehensive Metabolic Panel    TSH without Reflex    T4, Free    Hemoglobin A1C    Vitamin B12 & Folate    Vitamin B1    Vitamin D 25 Hydroxy    Magnesium    Iron and TIBC    Vitamin A    PTH, Intact    CBC Auto Differential    Zinc    Ferritin   4. Status post laparoscopic sleeve gastrectomy  Comprehensive Metabolic Panel    TSH without Reflex    T4, Free    Hemoglobin A1C    Vitamin B12 & Folate    Vitamin B1    Vitamin D 25 Hydroxy    Magnesium    Iron and TIBC    Vitamin A    PTH, Intact    CBC Auto Differential    Zinc    Ferritin   5. Kartagener's syndrome  Comprehensive Metabolic Panel    TSH without Reflex    T4, Free    Hemoglobin A1C    Vitamin B12 & Folate    Vitamin B1    Vitamin D 25 Hydroxy    Magnesium    Iron and TIBC    Vitamin A    PTH, Intact    CBC Auto Differential    Zinc    Ferritin   6. Obesity (BMI 30-39. 9)  Comprehensive Metabolic Panel    TSH without Reflex    T4, Free    Hemoglobin A1C    Vitamin B12 & Folate    Vitamin B1    Vitamin D 25 Hydroxy    Magnesium    Iron and TIBC    Vitamin A    PTH, Intact    CBC Auto Differential    Zinc    Ferritin   7. Vitamin D deficiency  vitamin D (ERGOCALCIFEROL) 1.25 MG (18108 UT) CAPS capsule    Comprehensive Metabolic Panel    TSH without Reflex    T4, Free    Hemoglobin A1C    Vitamin B12 & Folate    Vitamin B1    Vitamin D 25 Hydroxy    Magnesium    Iron and TIBC    Vitamin A    PTH, Intact    CBC Auto Differential    Zinc    Ferritin       Plan:    Dietitian visit today. Patient to continue to increase protein to obtain 60-80g/day, at least 48-64oz of fluid daily, and gradually increase exercise regimen. Labs reviewed and discussed with patient. Ferritin elevated. Labs to be sent to PCP for f/u. A1C 5.8%. Vitamin D low and e-prescribed. Vitamin A low. Advised to take OTC Vitamin A daily for 30 days. Follow-up  Return in about 3 months (around 3/14/2021). Orders this encounter:  Orders Placed This Encounter   Procedures    Comprehensive Metabolic Panel     Standing Status:   Future     Standing Expiration Date:   12/14/2021    TSH without Reflex     Standing Status:   Future     Standing Expiration Date:   12/14/2021    T4, Free     Standing Status:   Future     Standing Expiration Date:   12/14/2021    Hemoglobin A1C     Standing Status:   Future     Standing Expiration Date:   12/14/2021    Vitamin B12 & Folate     Standing Status:   Future     Standing Expiration Date:   12/14/2021    Vitamin B1     Standing Status:   Future     Standing Expiration Date:   12/14/2021    Vitamin D 25 Hydroxy     Standing Status:   Future     Standing Expiration Date:   12/14/2021    Magnesium     Standing Status:   Future     Standing Expiration Date:   12/14/2021    Iron and TIBC     Standing Status:   Future     Standing Expiration Date:   12/14/2021     Order Specific Question:   Is Patient Fasting? Answer:   Yes     Order Specific Question:   No of Hours?      Answer:   12 hours    Vitamin A     Standing Status:   Future     Standing Expiration Date:   12/14/2021    PTH, Intact     Standing Status:   Future     Standing Expiration Date:   12/14/2021    CBC Auto Differential     Standing Status:   Future     Standing Expiration Date:   12/14/2021    Zinc     Standing Status:   Future     Standing Expiration Date:   12/15/2021    Ferritin     Standing Status:   Future     Standing Expiration Date:   12/15/2021       Prescriptions this encounter:  Orders Placed This Encounter   Medications    vitamin D (ERGOCALCIFEROL) 1.25 MG (59458 UT) CAPS capsule     Sig: Take 1 capsule by mouth once a week for 8 doses     Dispense:  8 capsule     Refill:  0       Electronically signed by:  Brenda Elaine CNP

## 2020-12-16 LAB — VITAMIN B1 WHOLE BLOOD: 84 NMOL/L (ref 70–180)

## 2021-03-09 ENCOUNTER — HOSPITAL ENCOUNTER (OUTPATIENT)
Age: 61
Discharge: HOME | End: 2021-03-09
Payer: COMMERCIAL

## 2021-03-09 DIAGNOSIS — E55.9 VITAMIN D DEFICIENCY: ICD-10-CM

## 2021-03-09 DIAGNOSIS — R73.03 PREDIABETES: ICD-10-CM

## 2021-03-09 DIAGNOSIS — Z98.84 STATUS POST LAPAROSCOPIC SLEEVE GASTRECTOMY: ICD-10-CM

## 2021-03-09 DIAGNOSIS — M19.90 ARTHRITIS: ICD-10-CM

## 2021-03-09 DIAGNOSIS — Q89.3 SITUS INVERSUS TOTALIS: ICD-10-CM

## 2021-03-09 DIAGNOSIS — E66.9 OBESITY (BMI 30-39.9): ICD-10-CM

## 2021-03-09 DIAGNOSIS — Q89.3 KARTAGENER'S SYNDROME: ICD-10-CM

## 2021-03-09 LAB
ABSOLUTE EOS #: 0.08 K/UL (ref 0–0.44)
ABSOLUTE IMMATURE GRANULOCYTE: <0.03 K/UL (ref 0–0.3)
ABSOLUTE LYMPH #: 2.84 K/UL (ref 1.1–3.7)
ABSOLUTE MONO #: 0.44 K/UL (ref 0.1–1.2)
ALBUMIN SERPL-MCNC: 3.8 G/DL (ref 3.5–5.2)
ALBUMIN/GLOBULIN RATIO: 1.1 (ref 1–2.5)
ALP BLD-CCNC: 56 U/L (ref 35–104)
ALT SERPL-CCNC: 5 U/L (ref 5–33)
ANION GAP SERPL CALCULATED.3IONS-SCNC: 12 MMOL/L (ref 9–17)
AST SERPL-CCNC: 15 U/L
BASOPHILS # BLD: 1 % (ref 0–2)
BASOPHILS ABSOLUTE: 0.05 K/UL (ref 0–0.2)
BILIRUB SERPL-MCNC: 0.32 MG/DL (ref 0.3–1.2)
BUN BLDV-MCNC: 12 MG/DL (ref 6–20)
BUN/CREAT BLD: ABNORMAL (ref 9–20)
CALCIUM SERPL-MCNC: 9.2 MG/DL (ref 8.6–10.4)
CHLORIDE BLD-SCNC: 105 MMOL/L (ref 98–107)
CO2: 25 MMOL/L (ref 20–31)
CREAT SERPL-MCNC: 0.49 MG/DL (ref 0.5–0.9)
DIFFERENTIAL TYPE: ABNORMAL
EOSINOPHILS RELATIVE PERCENT: 1 % (ref 1–4)
ESTIMATED AVERAGE GLUCOSE: 114 MG/DL
FERRITIN: 372 UG/L (ref 13–150)
FOLATE: >20 NG/ML
GFR AFRICAN AMERICAN: >60 ML/MIN
GFR NON-AFRICAN AMERICAN: >60 ML/MIN
GFR SERPL CREATININE-BSD FRML MDRD: ABNORMAL ML/MIN/{1.73_M2}
GFR SERPL CREATININE-BSD FRML MDRD: ABNORMAL ML/MIN/{1.73_M2}
GLUCOSE BLD-MCNC: 88 MG/DL (ref 70–99)
HBA1C MFR BLD: 5.6 % (ref 4–6)
HCT VFR BLD CALC: 41.5 % (ref 36.3–47.1)
HEMOGLOBIN: 13.1 G/DL (ref 11.9–15.1)
IMMATURE GRANULOCYTES: 0 %
IRON SATURATION: 30 % (ref 20–55)
IRON: 55 UG/DL (ref 37–145)
LYMPHOCYTES # BLD: 49 % (ref 24–43)
MAGNESIUM: 1.9 MG/DL (ref 1.6–2.6)
MCH RBC QN AUTO: 29.5 PG (ref 25.2–33.5)
MCHC RBC AUTO-ENTMCNC: 31.6 G/DL (ref 28.4–34.8)
MCV RBC AUTO: 93.5 FL (ref 82.6–102.9)
MONOCYTES # BLD: 8 % (ref 3–12)
NRBC AUTOMATED: 0 PER 100 WBC
PDW BLD-RTO: 13.1 % (ref 11.8–14.4)
PLATELET # BLD: 267 K/UL (ref 138–453)
PLATELET ESTIMATE: ABNORMAL
PMV BLD AUTO: 11.1 FL (ref 8.1–13.5)
POTASSIUM SERPL-SCNC: 4.2 MMOL/L (ref 3.7–5.3)
PTH INTACT: 56.35 PG/ML (ref 15–65)
RBC # BLD: 4.44 M/UL (ref 3.95–5.11)
RBC # BLD: ABNORMAL 10*6/UL
SEG NEUTROPHILS: 41 % (ref 36–65)
SEGMENTED NEUTROPHILS ABSOLUTE COUNT: 2.39 K/UL (ref 1.5–8.1)
SODIUM BLD-SCNC: 142 MMOL/L (ref 135–144)
THYROXINE, FREE: 1.29 NG/DL (ref 0.93–1.7)
TOTAL IRON BINDING CAPACITY: 184 UG/DL (ref 250–450)
TOTAL PROTEIN: 7.2 G/DL (ref 6.4–8.3)
TSH SERPL DL<=0.05 MIU/L-ACNC: 2.08 MIU/L (ref 0.3–5)
UNSATURATED IRON BINDING CAPACITY: 129 UG/DL (ref 112–347)
VITAMIN B-12: 467 PG/ML (ref 232–1245)
VITAMIN D 25-HYDROXY: 31 NG/ML (ref 30–100)
WBC # BLD: 5.8 K/UL (ref 3.5–11.3)
WBC # BLD: ABNORMAL 10*3/UL

## 2021-03-09 PROCEDURE — 84439 ASSAY OF FREE THYROXINE: CPT

## 2021-03-09 PROCEDURE — 83735 ASSAY OF MAGNESIUM: CPT

## 2021-03-09 PROCEDURE — 83036 HEMOGLOBIN GLYCOSYLATED A1C: CPT

## 2021-03-09 PROCEDURE — 85025 COMPLETE CBC W/AUTO DIFF WBC: CPT

## 2021-03-09 PROCEDURE — 83970 ASSAY OF PARATHORMONE: CPT

## 2021-03-09 PROCEDURE — 82306 VITAMIN D 25 HYDROXY: CPT

## 2021-03-09 PROCEDURE — 82728 ASSAY OF FERRITIN: CPT

## 2021-03-09 PROCEDURE — 80053 COMPREHEN METABOLIC PANEL: CPT

## 2021-03-09 PROCEDURE — 84630 ASSAY OF ZINC: CPT

## 2021-03-09 PROCEDURE — 83550 IRON BINDING TEST: CPT

## 2021-03-09 PROCEDURE — 83540 ASSAY OF IRON: CPT

## 2021-03-09 PROCEDURE — 84425 ASSAY OF VITAMIN B-1: CPT

## 2021-03-09 PROCEDURE — 84590 ASSAY OF VITAMIN A: CPT

## 2021-03-09 PROCEDURE — 82607 VITAMIN B-12: CPT

## 2021-03-09 PROCEDURE — 82746 ASSAY OF FOLIC ACID SERUM: CPT

## 2021-03-09 PROCEDURE — 36415 COLL VENOUS BLD VENIPUNCTURE: CPT

## 2021-03-09 PROCEDURE — 84443 ASSAY THYROID STIM HORMONE: CPT

## 2021-03-11 LAB — ZINC: 75.4 UG/DL (ref 60–120)

## 2021-03-12 LAB
RETINYL PALMITATE: <0.02 MG/L (ref 0–0.1)
VITAMIN A LEVEL: 0.33 MG/L (ref 0.3–1.2)
VITAMIN A, INTERP: NORMAL

## 2021-03-13 LAB — VITAMIN B1 WHOLE BLOOD: 105 NMOL/L (ref 70–180)

## 2021-03-15 ENCOUNTER — OFFICE VISIT (OUTPATIENT)
Dept: BARIATRICS/WEIGHT MGMT | Age: 61
End: 2021-03-15
Payer: COMMERCIAL

## 2021-03-15 VITALS
WEIGHT: 210 LBS | HEART RATE: 41 BPM | SYSTOLIC BLOOD PRESSURE: 110 MMHG | HEIGHT: 66 IN | DIASTOLIC BLOOD PRESSURE: 80 MMHG | BODY MASS INDEX: 33.75 KG/M2

## 2021-03-15 DIAGNOSIS — M19.90 ARTHRITIS: ICD-10-CM

## 2021-03-15 DIAGNOSIS — Q89.3 KARTAGENER'S SYNDROME: Primary | ICD-10-CM

## 2021-03-15 DIAGNOSIS — Q89.3 SITUS INVERSUS TOTALIS: ICD-10-CM

## 2021-03-15 DIAGNOSIS — E66.9 OBESITY (BMI 30-39.9): ICD-10-CM

## 2021-03-15 DIAGNOSIS — Z86.39 HISTORY OF ELEVATED LIPIDS: ICD-10-CM

## 2021-03-15 DIAGNOSIS — Z98.84 STATUS POST LAPAROSCOPIC SLEEVE GASTRECTOMY: ICD-10-CM

## 2021-03-15 PROCEDURE — 99213 OFFICE O/P EST LOW 20 MIN: CPT | Performed by: NURSE PRACTITIONER

## 2021-03-15 RX ORDER — TRIAMCINOLONE ACETONIDE 1 MG/G
CREAM TOPICAL
COMMUNITY
Start: 2020-12-21

## 2021-03-15 RX ORDER — PROMETHAZINE HYDROCHLORIDE 25 MG/1
TABLET ORAL
COMMUNITY
Start: 2020-09-08

## 2021-03-15 NOTE — PROGRESS NOTES
Post-op Bariatric Surgery Note    Subjective     Patient is 6 months s/p laparoscopic sleeve gastrectomy, down 94 lbs. Overall, doing well. Incisions well healed. Consistent use of MVI and calcium. Physical activity includes walking. No pain or other specific problems or concerns. Allergies:  No Known Allergies    Past Medical History:     Past Medical History:   Diagnosis Date    Bronchiectasis (HCC)     Chronic cough     Chronic nasal congestion     d/t kartaganer syndrome    Complete situs inversus with dextrocardia     Dextrocardia     Dr. Milana Flaherty Hearing loss     no hearing aid at this time    HLD (hyperlipidemia)     Kartagener syndrome age 24    diagnosed age 24; bronchiectasis, chronic sinus congestion, situs inversus    Obesity     Osteoarthritis     Snores     denies apnea    Wears prescription eyeglasses     Wellness examination     SUSIE Davies-PCP [facility]   .     Past Surgical History:  Past Surgical History:   Procedure Laterality Date    COLONOSCOPY      FOOT SURGERY      rt foot (hardware remains)    INNER EAR SURGERY      rt / reconstruction    KNEE SURGERY      bilateral    NASAL SEPTUM SURGERY      SLEEVE GASTRECTOMY  09/08/2020    LAPAROSCOPIC XI ROBOTIC GASTRECTOMY SLEEVE, LIVER BIOPSY, EGD     SLEEVE GASTRECTOMY N/A 9/8/2020    LAPAROSCOPIC XI ROBOTIC GASTRECTOMY SLEEVE, EGD performed by Kori Baker DO at [facility]      adenoids    UPPER GASTROINTESTINAL ENDOSCOPY N/A 6/17/2020    EGD BIOPSY performed by Kori Baker DO at [place] [address]       Family History:  Family History   Problem Relation Age of Onset    Cancer Mother         colon, liver, lungs    Diabetes Maternal Grandmother     Other Father         dementia    Diabetes Father     Diabetes Paternal Grandmother        Social History:  Social History     Socioeconomic History    Marital status: Single     Spouse name: Not on file    Number of children: Not on file    Years of education: Not on file    Highest education level: Not on file   Occupational History    Not on file   Social Needs    Financial resource strain: Not on file    Food insecurity     Worry: Not on file     Inability: Not on file    Transportation needs     Medical: Not on file     Non-medical: Not on file   Tobacco Use    Smoking status: Former Smoker     Quit date: 1993     Years since quittin.2    Smokeless tobacco: Never Used   Substance and Sexual Activity    Alcohol use: Yes     Comment: social    Drug use: No    Sexual activity: Not on file   Lifestyle    Physical activity     Days per week: Not on file     Minutes per session: Not on file    Stress: Not on file   Relationships    Social connections     Talks on phone: Not on file     Gets together: Not on file     Attends Restorationist service: Not on file     Active member of club or organization: Not on file     Attends meetings of clubs or organizations: Not on file     Relationship status: Not on file    Intimate partner violence     Fear of current or ex partner: Not on file     Emotionally abused: Not on file     Physically abused: Not on file     Forced sexual activity: Not on file   Other Topics Concern    Not on file   Social History Narrative    Not on file       Current Medications:  Current Outpatient Medications   Medication Sig Dispense Refill    promethazine (PHENERGAN) 25 MG tablet promethazine 25 mg tablet      Calcium Carbonate Antacid (TUMS PO) Take by mouth      Multiple Vitamins-Minerals (CELEBRATE MULTI-COMPLETE 39) CHEW Take by mouth      Prenatal MV-Min-Fe Fum-FA-DHA (PRENATAL 1 PO) Take by mouth      triamcinolone (KENALOG) 0.1 % cream APPLY TO AFFECTED AREA TWICE DAILY      vitamin D (ERGOCALCIFEROL) 1.25 MG (13391 UT) CAPS capsule Take 1 capsule by mouth once a week for 8 doses 8 capsule 0    linaCLOtide (LINZESS) 72 MCG CAPS capsule Take 1 capsule by mouth every morning (before breakfast) (Patient not taking: Reported on 3/15/2021) 30 capsule 0     No current facility-administered medications for this visit. Vital Signs:  /80 (Site: Right Upper Arm, Position: Sitting, Cuff Size: Large Adult)   Pulse (!) 41   Ht 5' 5.5\" (1.664 m)   Wt 210 lb (95.3 kg)   LMP 06/17/2011   BMI 34.41 kg/m²     BMI/Height/Weight:  Body mass index is 34.41 kg/m². Review of Systems - A review of systems was performed. All was negative unless otherwise documented in HPI. Constitutional: Negative for fever, chills and diaphoresis. HENT: Negative for hearing loss and trouble swallowing. Eyes: Negative for photophobia and visual disturbance. Respiratory: Negative for cough, shortness of breath and wheezing. Cardiovascular: Negative for chest pain and palpitations. Gastrointestinal: Negative for nausea, vomiting, abdominal pain, diarrhea, constipation, blood in stool and abdominal distention. Endocrine: Negative for polydipsia, polyphagia and polyuria. Genitourinary: Negative for dysuria, frequency, hematuria and difficulty urinating. Musculoskeletal: Negative for myalgias, joint swelling. Skin: Negative for pallor and rash. Neurological: Negative for dizziness, tremors, light-headedness and headaches. Psychiatric/Behavioral: Negative for sleep disturbance and dysphoric mood. Objective:      Physical Exam   Vital signs reviewed. General: Well-developed and well-nourished. No acute distress. Skin: Warm, dry and intact. HEENT: Normocephalic. EOMs intact. Conjunctivae normal. Neck supple. Cardiovascular: Normal rate, regular rhythm. Pulmonary/Chest: Normal effort. Lungs clear to auscultation. No rales, rhonchi or wheezing. Abdominal: Positive bowel sounds. Soft, nontender. Nondistended. No rigidity, rebound, or guarding. Musculoskeletal: Movement x4. No edema. Neurological: Gait normal. Alert and oriented to person, place, and time. Psychiatric: Normal mood and affect.

## 2021-04-14 ENCOUNTER — TELEPHONE (OUTPATIENT)
Dept: BARIATRICS/WEIGHT MGMT | Age: 61
End: 2021-04-14

## 2021-04-14 NOTE — TELEPHONE ENCOUNTER
Next Visit Date:  Visit date not found    Patient Surgery Date  9/9/20    Type of  Surgery  : sleeve      CC: Pt. Called today stating that they are having Sx 4/21/21 with  for a Mastoidectomy on Right Ear . Dr. Christopher Ro wanted pt. To consult with physician about stopping vitamins for 2 weeks before Sx. Please advise

## 2021-06-15 ENCOUNTER — OFFICE VISIT (OUTPATIENT)
Dept: BARIATRICS/WEIGHT MGMT | Age: 61
End: 2021-06-15
Payer: COMMERCIAL

## 2021-06-15 VITALS
HEART RATE: 56 BPM | SYSTOLIC BLOOD PRESSURE: 90 MMHG | WEIGHT: 191 LBS | DIASTOLIC BLOOD PRESSURE: 68 MMHG | BODY MASS INDEX: 30.7 KG/M2 | HEIGHT: 66 IN

## 2021-06-15 DIAGNOSIS — Q89.3 KARTAGENER'S SYNDROME: ICD-10-CM

## 2021-06-15 DIAGNOSIS — M19.90 ARTHRITIS: ICD-10-CM

## 2021-06-15 DIAGNOSIS — Q89.3 SITUS INVERSUS TOTALIS: ICD-10-CM

## 2021-06-15 DIAGNOSIS — E66.9 OBESITY (BMI 30-39.9): ICD-10-CM

## 2021-06-15 DIAGNOSIS — Z98.84 STATUS POST LAPAROSCOPIC SLEEVE GASTRECTOMY: ICD-10-CM

## 2021-06-15 DIAGNOSIS — R73.03 PREDIABETES: Primary | ICD-10-CM

## 2021-06-15 PROCEDURE — 99213 OFFICE O/P EST LOW 20 MIN: CPT | Performed by: NURSE PRACTITIONER

## 2021-06-15 NOTE — PROGRESS NOTES
Years of education: Not on file    Highest education level: Not on file   Occupational History    Not on file   Tobacco Use    Smoking status: Former Smoker     Quit date: 1993     Years since quittin.4    Smokeless tobacco: Never Used   Vaping Use    Vaping Use: Never used   Substance and Sexual Activity    Alcohol use: Yes     Comment: social    Drug use: No    Sexual activity: Not on file   Other Topics Concern    Not on file   Social History Narrative    Not on file     Social Determinants of Health     Financial Resource Strain:     Difficulty of Paying Living Expenses:    Food Insecurity:     Worried About [email or web address] in the Last Year:     [address] in the Last Year:    Transportation Needs:     Lack of Transportation (Medical):      Lack of Transportation (Non-Medical):    Physical Activity:     Days of Exercise per Week:     Minutes of Exercise per Session:    Stress:     Feeling of Stress :    Social Connections:     Frequency of Communication with Friends and Family:     Frequency of Social Gatherings with Friends and Family:     Attends Mu-ism Services:     Active Member of Clubs or Organizations:     Attends Club or Organization Meetings:     Marital Status:    Intimate Partner Violence:     Fear of Current or Ex-Partner:     Emotionally Abused:     Physically Abused:     Sexually Abused:        Current Medications:  Current Outpatient Medications   Medication Sig Dispense Refill    Prenatal MV-Min-Fe Fum-FA-DHA (PRENATAL 1 PO) Take by mouth      triamcinolone (KENALOG) 0.1 % cream APPLY TO AFFECTED AREA TWICE DAILY      promethazine (PHENERGAN) 25 MG tablet promethazine 25 mg tablet      Calcium Carbonate Antacid (TUMS PO) Take by mouth      Multiple Vitamins-Minerals (CELEBRATE MULTI-COMPLETE 36) CHEW Take by mouth      linaCLOtide (LINZESS) 72 MCG CAPS capsule Take 1 capsule by mouth every morning (before breakfast) (Patient not taking: thought content normal. Cognition and memory intact. Assessment:       Diagnosis Orders   1. Prediabetes  Comprehensive Metabolic Panel    TSH without Reflex    T4, Free    Hemoglobin A1C    Vitamin B12 & Folate    Vitamin B1    Vitamin D 25 Hydroxy    Magnesium    Iron and TIBC    Vitamin A    Lipid Panel    PTH, Intact    CBC Auto Differential    Zinc    Ferritin   2. Kartagener's syndrome  Comprehensive Metabolic Panel    TSH without Reflex    T4, Free    Hemoglobin A1C    Vitamin B12 & Folate    Vitamin B1    Vitamin D 25 Hydroxy    Magnesium    Iron and TIBC    Vitamin A    Lipid Panel    PTH, Intact    CBC Auto Differential    Zinc    Ferritin   3. Situs inversus totalis  Comprehensive Metabolic Panel    TSH without Reflex    T4, Free    Hemoglobin A1C    Vitamin B12 & Folate    Vitamin B1    Vitamin D 25 Hydroxy    Magnesium    Iron and TIBC    Vitamin A    Lipid Panel    PTH, Intact    CBC Auto Differential    Zinc    Ferritin   4. Arthritis  Comprehensive Metabolic Panel    TSH without Reflex    T4, Free    Hemoglobin A1C    Vitamin B12 & Folate    Vitamin B1    Vitamin D 25 Hydroxy    Magnesium    Iron and TIBC    Vitamin A    Lipid Panel    PTH, Intact    CBC Auto Differential    Zinc    Ferritin   5. Status post laparoscopic sleeve gastrectomy  Comprehensive Metabolic Panel    TSH without Reflex    T4, Free    Hemoglobin A1C    Vitamin B12 & Folate    Vitamin B1    Vitamin D 25 Hydroxy    Magnesium    Iron and TIBC    Vitamin A    Lipid Panel    PTH, Intact    CBC Auto Differential    Zinc    Ferritin   6. Obesity (BMI 30-39. 9)  Comprehensive Metabolic Panel    TSH without Reflex    T4, Free    Hemoglobin A1C    Vitamin B12 & Folate    Vitamin B1    Vitamin D 25 Hydroxy    Magnesium    Iron and TIBC    Vitamin A    Lipid Panel    PTH, Intact    CBC Auto Differential    Zinc    Ferritin       Plan:    Dietitian visit today.   Patient to continue to increase protein to obtain 60-80g/day, at least

## 2021-06-15 NOTE — PROGRESS NOTES
Medical Nutrition Therapy  Metabolic and Bariatric surgery  9 months after surgery follow up note         Pt reports:         Vitals:   Vitals:    06/15/21 1115   BP: 90/68   Site: Right Upper Arm   Position: Sitting   Cuff Size: Large Adult   Pulse: 56   Weight: 191 lb (86.6 kg)   Height: 5' 5.5\" (1.664 m)      Body mass index is 31.3 kg/m². Labs reviewed:     Multivitamin/mineral intake:MVI, b complex  Calcium intake:   2 daily  Other:            Nutrition Assessment:   PES: Inadequate food and beverage intake r/t WLS as evidenced by loss of excess body weight 113lb. Goals   60-80gm of protein  48-64oz of fluid  Vitamin adherance  Basic adherance to WLS behavious and this document has been scanned into the chart.        [x] met     []  Not met        Plan:   F/u one year         Denise De La Rosa RD, LD

## 2021-09-09 ENCOUNTER — HOSPITAL ENCOUNTER (OUTPATIENT)
Age: 61
Discharge: HOME | End: 2021-09-09
Payer: COMMERCIAL

## 2021-09-09 DIAGNOSIS — M19.90 ARTHRITIS: ICD-10-CM

## 2021-09-09 DIAGNOSIS — E66.9 OBESITY (BMI 30-39.9): ICD-10-CM

## 2021-09-09 DIAGNOSIS — Q89.3 KARTAGENER'S SYNDROME: ICD-10-CM

## 2021-09-09 DIAGNOSIS — Q89.3 SITUS INVERSUS TOTALIS: ICD-10-CM

## 2021-09-09 DIAGNOSIS — R73.03 PREDIABETES: ICD-10-CM

## 2021-09-09 DIAGNOSIS — Z98.84 STATUS POST LAPAROSCOPIC SLEEVE GASTRECTOMY: ICD-10-CM

## 2021-09-09 LAB
ABSOLUTE EOS #: 0.13 K/UL (ref 0–0.44)
ABSOLUTE IMMATURE GRANULOCYTE: <0.03 K/UL (ref 0–0.3)
ABSOLUTE LYMPH #: 3.42 K/UL (ref 1.1–3.7)
ABSOLUTE MONO #: 0.56 K/UL (ref 0.1–1.2)
ALBUMIN SERPL-MCNC: 3.8 G/DL (ref 3.5–5.2)
ALBUMIN/GLOBULIN RATIO: 1.1 (ref 1–2.5)
ALP BLD-CCNC: 58 U/L (ref 35–104)
ALT SERPL-CCNC: 6 U/L (ref 5–33)
ANION GAP SERPL CALCULATED.3IONS-SCNC: 9 MMOL/L (ref 9–17)
AST SERPL-CCNC: 16 U/L
BASOPHILS # BLD: 1 % (ref 0–2)
BASOPHILS ABSOLUTE: 0.09 K/UL (ref 0–0.2)
BILIRUB SERPL-MCNC: 0.48 MG/DL (ref 0.3–1.2)
BUN BLDV-MCNC: 13 MG/DL (ref 6–20)
BUN/CREAT BLD: NORMAL (ref 9–20)
CALCIUM SERPL-MCNC: 9.5 MG/DL (ref 8.6–10.4)
CHLORIDE BLD-SCNC: 105 MMOL/L (ref 98–107)
CHOLESTEROL/HDL RATIO: 3.4
CHOLESTEROL: 183 MG/DL
CO2: 26 MMOL/L (ref 20–31)
CREAT SERPL-MCNC: 0.55 MG/DL (ref 0.5–0.9)
DIFFERENTIAL TYPE: ABNORMAL
EOSINOPHILS RELATIVE PERCENT: 2 % (ref 1–4)
ESTIMATED AVERAGE GLUCOSE: 114 MG/DL
FERRITIN: 498 UG/L (ref 13–150)
GFR AFRICAN AMERICAN: >60 ML/MIN
GFR NON-AFRICAN AMERICAN: >60 ML/MIN
GFR SERPL CREATININE-BSD FRML MDRD: NORMAL ML/MIN/{1.73_M2}
GFR SERPL CREATININE-BSD FRML MDRD: NORMAL ML/MIN/{1.73_M2}
GLUCOSE BLD-MCNC: 81 MG/DL (ref 70–99)
HBA1C MFR BLD: 5.6 % (ref 4–6)
HCT VFR BLD CALC: 40.1 % (ref 36.3–47.1)
HDLC SERPL-MCNC: 54 MG/DL
HEMOGLOBIN: 12.6 G/DL (ref 11.9–15.1)
IMMATURE GRANULOCYTES: 0 %
IRON SATURATION: 33 % (ref 20–55)
IRON: 61 UG/DL (ref 37–145)
LDL CHOLESTEROL: 115 MG/DL (ref 0–130)
LYMPHOCYTES # BLD: 44 % (ref 24–43)
MAGNESIUM: 1.9 MG/DL (ref 1.6–2.6)
MCH RBC QN AUTO: 29.4 PG (ref 25.2–33.5)
MCHC RBC AUTO-ENTMCNC: 31.4 G/DL (ref 28.4–34.8)
MCV RBC AUTO: 93.7 FL (ref 82.6–102.9)
MONOCYTES # BLD: 7 % (ref 3–12)
NRBC AUTOMATED: 0 PER 100 WBC
PDW BLD-RTO: 12.9 % (ref 11.8–14.4)
PLATELET # BLD: 297 K/UL (ref 138–453)
PLATELET ESTIMATE: ABNORMAL
PMV BLD AUTO: 10.3 FL (ref 8.1–13.5)
POTASSIUM SERPL-SCNC: 4.3 MMOL/L (ref 3.7–5.3)
PTH INTACT: 33.67 PG/ML (ref 15–65)
RBC # BLD: 4.28 M/UL (ref 3.95–5.11)
RBC # BLD: ABNORMAL 10*6/UL
SEG NEUTROPHILS: 46 % (ref 36–65)
SEGMENTED NEUTROPHILS ABSOLUTE COUNT: 3.61 K/UL (ref 1.5–8.1)
SODIUM BLD-SCNC: 140 MMOL/L (ref 135–144)
THYROXINE, FREE: 1.37 NG/DL (ref 0.93–1.7)
TOTAL IRON BINDING CAPACITY: 187 UG/DL (ref 250–450)
TOTAL PROTEIN: 7.4 G/DL (ref 6.4–8.3)
TRIGL SERPL-MCNC: 72 MG/DL
TSH SERPL DL<=0.05 MIU/L-ACNC: 2.33 MIU/L (ref 0.3–5)
UNSATURATED IRON BINDING CAPACITY: 126 UG/DL (ref 112–347)
VITAMIN D 25-HYDROXY: 24.5 NG/ML (ref 30–100)
VLDLC SERPL CALC-MCNC: NORMAL MG/DL (ref 1–30)
WBC # BLD: 7.8 K/UL (ref 3.5–11.3)
WBC # BLD: ABNORMAL 10*3/UL

## 2021-09-09 PROCEDURE — 83735 ASSAY OF MAGNESIUM: CPT

## 2021-09-09 PROCEDURE — 84443 ASSAY THYROID STIM HORMONE: CPT

## 2021-09-09 PROCEDURE — 84630 ASSAY OF ZINC: CPT

## 2021-09-09 PROCEDURE — 83550 IRON BINDING TEST: CPT

## 2021-09-09 PROCEDURE — 84439 ASSAY OF FREE THYROXINE: CPT

## 2021-09-09 PROCEDURE — 83540 ASSAY OF IRON: CPT

## 2021-09-09 PROCEDURE — 85025 COMPLETE CBC W/AUTO DIFF WBC: CPT

## 2021-09-09 PROCEDURE — 80061 LIPID PANEL: CPT

## 2021-09-09 PROCEDURE — 82607 VITAMIN B-12: CPT

## 2021-09-09 PROCEDURE — 36415 COLL VENOUS BLD VENIPUNCTURE: CPT

## 2021-09-09 PROCEDURE — 83970 ASSAY OF PARATHORMONE: CPT

## 2021-09-09 PROCEDURE — 82746 ASSAY OF FOLIC ACID SERUM: CPT

## 2021-09-09 PROCEDURE — 83036 HEMOGLOBIN GLYCOSYLATED A1C: CPT

## 2021-09-09 PROCEDURE — 84590 ASSAY OF VITAMIN A: CPT

## 2021-09-09 PROCEDURE — 80053 COMPREHEN METABOLIC PANEL: CPT

## 2021-09-09 PROCEDURE — 84425 ASSAY OF VITAMIN B-1: CPT

## 2021-09-09 PROCEDURE — 82306 VITAMIN D 25 HYDROXY: CPT

## 2021-09-09 PROCEDURE — 82728 ASSAY OF FERRITIN: CPT

## 2021-09-10 DIAGNOSIS — E55.9 VITAMIN D DEFICIENCY: Primary | ICD-10-CM

## 2021-09-10 LAB
FOLATE: >20 NG/ML
VITAMIN B-12: 573 PG/ML (ref 232–1245)

## 2021-09-10 RX ORDER — ERGOCALCIFEROL 1.25 MG/1
50000 CAPSULE ORAL WEEKLY
Qty: 8 CAPSULE | Refills: 0 | Status: SHIPPED | OUTPATIENT
Start: 2021-09-10 | End: 2021-10-30

## 2021-09-12 LAB — ZINC: 84.5 UG/DL (ref 60–120)

## 2021-09-13 LAB
RETINYL PALMITATE: 0.02 MG/L (ref 0–0.1)
VITAMIN A LEVEL: 0.43 MG/L (ref 0.3–1.2)
VITAMIN A, INTERP: NORMAL
VITAMIN B1 WHOLE BLOOD: 128 NMOL/L (ref 70–180)

## 2021-09-14 ENCOUNTER — HOSPITAL ENCOUNTER (OUTPATIENT)
Age: 61
Setting detail: SPECIMEN
Discharge: HOME | End: 2021-09-14
Payer: COMMERCIAL

## 2021-09-14 LAB
ABSOLUTE EOS #: 0.13 K/UL (ref 0–0.44)
ABSOLUTE IMMATURE GRANULOCYTE: <0.03 K/UL (ref 0–0.3)
ABSOLUTE LYMPH #: 3.69 K/UL (ref 1.1–3.7)
ABSOLUTE MONO #: 0.57 K/UL (ref 0.1–1.2)
BASOPHILS # BLD: 1 % (ref 0–2)
BASOPHILS ABSOLUTE: 0.06 K/UL (ref 0–0.2)
DIFFERENTIAL TYPE: ABNORMAL
EOSINOPHILS RELATIVE PERCENT: 2 % (ref 1–4)
HCT VFR BLD CALC: 38.1 % (ref 36.3–47.1)
HEMOGLOBIN: 12.3 G/DL (ref 11.9–15.1)
IMMATURE GRANULOCYTES: 0 %
LYMPHOCYTES # BLD: 47 % (ref 24–43)
MCH RBC QN AUTO: 29.1 PG (ref 25.2–33.5)
MCHC RBC AUTO-ENTMCNC: 32.3 G/DL (ref 28.4–34.8)
MCV RBC AUTO: 90.1 FL (ref 82.6–102.9)
MONOCYTES # BLD: 7 % (ref 3–12)
NRBC AUTOMATED: 0 PER 100 WBC
PDW BLD-RTO: 13 % (ref 11.8–14.4)
PLATELET # BLD: 281 K/UL (ref 138–453)
PLATELET ESTIMATE: ABNORMAL
PMV BLD AUTO: 10.9 FL (ref 8.1–13.5)
RBC # BLD: 4.23 M/UL (ref 3.95–5.11)
RBC # BLD: ABNORMAL 10*6/UL
SEG NEUTROPHILS: 43 % (ref 36–65)
SEGMENTED NEUTROPHILS ABSOLUTE COUNT: 3.31 K/UL (ref 1.5–8.1)
VITAMIN B-12: 575 PG/ML (ref 232–1245)
WBC # BLD: 7.8 K/UL (ref 3.5–11.3)
WBC # BLD: ABNORMAL 10*3/UL

## 2021-09-14 PROCEDURE — 83550 IRON BINDING TEST: CPT

## 2021-09-14 PROCEDURE — 83540 ASSAY OF IRON: CPT

## 2021-09-14 PROCEDURE — 82728 ASSAY OF FERRITIN: CPT

## 2021-09-14 PROCEDURE — 85025 COMPLETE CBC W/AUTO DIFF WBC: CPT

## 2021-09-14 PROCEDURE — 82607 VITAMIN B-12: CPT

## 2021-09-14 PROCEDURE — 36415 COLL VENOUS BLD VENIPUNCTURE: CPT

## 2021-09-15 ENCOUNTER — OFFICE VISIT (OUTPATIENT)
Dept: BARIATRICS/WEIGHT MGMT | Age: 61
End: 2021-09-15
Payer: COMMERCIAL

## 2021-09-15 ENCOUNTER — TELEPHONE (OUTPATIENT)
Dept: BARIATRICS/WEIGHT MGMT | Age: 61
End: 2021-09-15

## 2021-09-15 VITALS
HEIGHT: 66 IN | SYSTOLIC BLOOD PRESSURE: 122 MMHG | RESPIRATION RATE: 20 BRPM | HEART RATE: 64 BPM | WEIGHT: 175 LBS | BODY MASS INDEX: 28.12 KG/M2 | DIASTOLIC BLOOD PRESSURE: 78 MMHG

## 2021-09-15 DIAGNOSIS — Q89.3 SITUS INVERSUS TOTALIS: ICD-10-CM

## 2021-09-15 DIAGNOSIS — R73.03 PREDIABETES: ICD-10-CM

## 2021-09-15 DIAGNOSIS — E55.9 VITAMIN D DEFICIENCY: ICD-10-CM

## 2021-09-15 DIAGNOSIS — Z86.39 HISTORY OF ELEVATED LIPIDS: ICD-10-CM

## 2021-09-15 DIAGNOSIS — Q89.3 KARTAGENER'S SYNDROME: ICD-10-CM

## 2021-09-15 DIAGNOSIS — Z98.84 STATUS POST LAPAROSCOPIC SLEEVE GASTRECTOMY: ICD-10-CM

## 2021-09-15 DIAGNOSIS — R79.89 ELEVATED FERRITIN: Primary | ICD-10-CM

## 2021-09-15 DIAGNOSIS — M19.90 ARTHRITIS: ICD-10-CM

## 2021-09-15 DIAGNOSIS — E66.3 OVERWEIGHT (BMI 25.0-29.9): ICD-10-CM

## 2021-09-15 PROBLEM — E66.9 OBESITY (BMI 30-39.9): Status: RESOLVED | Noted: 2020-12-14 | Resolved: 2021-09-15

## 2021-09-15 LAB
FERRITIN: 556 UG/L (ref 13–150)
IRON SATURATION: 23 % (ref 20–55)
IRON: 41 UG/DL (ref 37–145)
TOTAL IRON BINDING CAPACITY: 182 UG/DL (ref 250–450)
UNSATURATED IRON BINDING CAPACITY: 141 UG/DL (ref 112–347)

## 2021-09-15 PROCEDURE — 99213 OFFICE O/P EST LOW 20 MIN: CPT | Performed by: NURSE PRACTITIONER

## 2021-09-15 NOTE — PROGRESS NOTES
Medical Nutrition Therapy  Metabolic and Bariatric surgery  One year after surgery         Pt reports:         Vitals:   Vitals:    09/15/21 0854   BP: 122/78   Site: Right Upper Arm   Position: Sitting   Cuff Size: Medium Adult   Pulse: 64   Resp: 20   Weight: 175 lb (79.4 kg)   Height: 5' 5.5\" (1.664 m)      Body mass index is 28.68 kg/m². Labs reviewed:     Multivitamin/mineral intake:one prenatal MVI and a b complex  Calcium intake:one     Other:              Nutrition Assessment:   PES: Inadequate food and beverage intake r/t WLS as evidenced by loss of excess body weight 129lb. Goals   60-80gm of protein  48-64oz of fluid  Vitamin adherance  Basic adherance to WLS behavious and this document has been scanned into the chart.        [] met     [x]  Not met        Plan:  Add another calcium daily   F/u 18 months post op         Lucila Wilkins RD, LD

## 2021-09-15 NOTE — PROGRESS NOTES
Post-op Bariatric Surgery Note    Subjective     Patient is 1 year s/p laparoscopic sleeve gastrectomy, down 129 lbs. Overall, doing well. Incisions well healed. Consistent use of MVI and calcium. Physical activity includes walking. Following with ENT and had recent right tympanomastoidectomy. Today, she is feeling some chest congestion. No pain or other specific problems or concerns. Allergies:  No Known Allergies    Past Medical History:     Past Medical History:   Diagnosis Date    Bronchiectasis (HCC)     Chronic cough     Chronic nasal congestion     d/t kartaganer syndrome    Complete situs inversus with dextrocardia     Dextrocardia     Dr. Joyce Wheeler Hearing loss     no hearing aid at this time    HLD (hyperlipidemia)     Kartagener syndrome age 24    diagnosed age 24; bronchiectasis, chronic sinus congestion, situs inversus    Obesity     Osteoarthritis     Snores     denies apnea    Wears prescription eyeglasses     Wellness examination     SUSIE Davies-PC[facility]   .     Past Surgical History:  Past Surgical History:   Procedure Laterality Date    COLONOSCOPY      FOOT SURGERY      rt foot (hardware remains)    INNER EAR SURGERY      rt / reconstruction    KNEE SURGERY      bilateral    NASAL SEPTUM SURGERY      SLEEVE GASTRECTOMY  09/08/2020    LAPAROSCOPIC XI ROBOTIC GASTRECTOMY SLEEVE, LIVER BIOPSY, EGD     SLEEVE GASTRECTOMY N/A 9/8/2020    LAPAROSCOPIC XI ROBOTIC GASTRECTOMY SLEEVE, EGD performed by Elis Simmons DO at [facility]      adenoids    UPPER GASTROINTESTINAL ENDOSCOPY N/A 6/17/2020    EGD BIOPSY performed by Elis Simmons DO at [facility] OR       Family History:  Family History   Problem Relation Age of Onset    Cancer Mother         colon, liver, lungs    Diabetes Maternal Grandmother     Other Father         dementia    Diabetes Father     Diabetes Paternal Grandmother        Social History:  Social History     Socioeconomic mouth      vitamin D (ERGOCALCIFEROL) 1.25 MG (88475 UT) CAPS capsule Take 1 capsule by mouth once a week for 8 doses (Patient not taking: Reported on 9/15/2021) 8 capsule 0     No current facility-administered medications for this visit. Vital Signs:  /78 (Site: Right Upper Arm, Position: Sitting, Cuff Size: Medium Adult)   Pulse 64   Resp 20   Ht 5' 5.5\" (1.664 m)   Wt 175 lb (79.4 kg)   LMP 06/17/2011   BMI 28.68 kg/m²     BMI/Height/Weight:  Body mass index is 28.68 kg/m². Review of Systems - A review of systems was performed. All was negative unless otherwise documented in HPI. Constitutional: Negative for fever, chills and diaphoresis. HENT: Negative for trouble swallowing. Positive for hearing loss. Eyes: Negative for photophobia and visual disturbance. Respiratory: Negative for cough or shortness of breath. Cardiovascular: Negative for chest pain and palpitations. Gastrointestinal: Negative for nausea, vomiting, abdominal pain, diarrhea, constipation, blood in stool and abdominal distention. Endocrine: Negative for polydipsia, polyphagia and polyuria. Genitourinary: Negative for dysuria, frequency, hematuria and difficulty urinating. Musculoskeletal: Negative for myalgias, joint swelling. Skin: Negative for pallor and rash. Neurological: Negative for dizziness, tremors, light-headedness and headaches. Psychiatric/Behavioral: Negative for sleep disturbance and dysphoric mood. Objective:      Physical Exam   Vital signs reviewed. General: Well-developed and well-nourished. No acute distress. Skin: Warm, dry and intact. HEENT: Normocephalic. EOMs intact. Conjunctivae normal. Neck supple. Cardiovascular: Normal rate, regular rhythm. Pulmonary/Chest: Normal effort. Lungs clear to auscultation. Scattered wheezing. Abdominal: Positive bowel sounds. Soft, nontender. Nondistended. No rigidity, rebound, or guarding. Musculoskeletal: Movement x4.  No edema. Neurological: Gait normal. Alert and oriented to person, place, and time. Psychiatric: Normal mood and affect. Speech and behavior normal. Judgment and thought content normal. Cognition and memory intact. Assessment:       Diagnosis Orders   1. Elevated ferritin  Ambulatory referral to Hematology Oncology   2. Situs inversus totalis  Ambulatory referral to Hematology Oncology   3. Kartagener's syndrome  Ambulatory referral to Hematology Oncology   4. Prediabetes  Ambulatory referral to Hematology Oncology   5. Status post laparoscopic sleeve gastrectomy  Ambulatory referral to Hematology Oncology   6. Arthritis  Ambulatory referral to Hematology Oncology   7. History of elevated lipids  Ambulatory referral to Hematology Oncology   8. Vitamin D deficiency  Ambulatory referral to Hematology Oncology   9. Overweight (BMI 25.0-29. 9)  Ambulatory referral to Hematology Oncology       Plan:    Dietitian visit today. Patient to continue to increase protein to obtain 60-80g/day, at least 48-64oz of fluid daily, and gradually increase exercise regimen. Lung sounds with wheezing today. She is going to Urgent Care after clinic visit today. Labs reviewed and discussed with patient. Vitamin D level low and already e-prescribed. Ferritin trending up. Referral to hematology for elevated ferritin. Follow-up  Return in about 6 months (around 3/15/2022). Orders this encounter:  Orders Placed This Encounter   Procedures    Ambulatory referral to Hematology Oncology     Referral Priority:   Routine     Referral Type:   Consult for Advice and Opinion     Referral Reason:   Specialty Services Required     Referred to Provider:   Hattie Degroot MD     Number of Visits Requested:   1       Prescriptions this encounter:  No orders of the defined types were placed in this encounter.       Electronically signed by:  Marie Khan CNP

## 2021-09-21 ENCOUNTER — TELEPHONE (OUTPATIENT)
Dept: ONCOLOGY | Age: 61
End: 2021-09-21

## 2021-09-22 ENCOUNTER — TELEPHONE (OUTPATIENT)
Dept: ONCOLOGY | Age: 61
End: 2021-09-22

## 2021-09-28 NOTE — TELEPHONE ENCOUNTER
#2 CALLED PT TO SCHEDULE APPOINTMENT, PT STATES SHE WAS SEEN BY A DIFFERENT HEMATOLOGIST. SENDING REFERRAL BACK PLEASE ADVISE.

## 2022-08-27 ENCOUNTER — APPOINTMENT (OUTPATIENT)
Dept: GENERAL RADIOLOGY | Age: 62
End: 2022-08-27
Payer: COMMERCIAL

## 2022-08-27 ENCOUNTER — HOSPITAL ENCOUNTER (EMERGENCY)
Age: 62
Discharge: HOME | End: 2022-08-28
Attending: EMERGENCY MEDICINE
Payer: COMMERCIAL

## 2022-08-27 DIAGNOSIS — J06.9 ACUTE UPPER RESPIRATORY INFECTION: ICD-10-CM

## 2022-08-27 DIAGNOSIS — M79.10 MYALGIA: Primary | ICD-10-CM

## 2022-08-27 DIAGNOSIS — R05.9 COUGH: ICD-10-CM

## 2022-08-27 PROCEDURE — 99284 EMERGENCY DEPT VISIT MOD MDM: CPT

## 2022-08-27 PROCEDURE — 87804 INFLUENZA ASSAY W/OPTIC: CPT

## 2022-08-27 PROCEDURE — 87635 SARS-COV-2 COVID-19 AMP PRB: CPT

## 2022-08-27 PROCEDURE — 71045 X-RAY EXAM CHEST 1 VIEW: CPT

## 2022-08-27 PROCEDURE — 93005 ELECTROCARDIOGRAM TRACING: CPT | Performed by: EMERGENCY MEDICINE

## 2022-08-27 ASSESSMENT — PAIN - FUNCTIONAL ASSESSMENT: PAIN_FUNCTIONAL_ASSESSMENT: NONE - DENIES PAIN

## 2022-08-27 NOTE — LETTER
[facility] ED  [address] [id number]  Phone: [phone]               August 28, 2022    Patient: Diomedes Wynne   YOB: 1960   Date of Visit: 8/27/2022       To Whom It May Concern:    Radha Covarrubias was seen and treated in our emergency department on 8/27/2022. She may return to work on 8/30/2022.       Sincerely,           Signature:__________________________________

## 2022-08-28 VITALS
WEIGHT: 165 LBS | DIASTOLIC BLOOD PRESSURE: 55 MMHG | BODY MASS INDEX: 26.52 KG/M2 | HEIGHT: 66 IN | TEMPERATURE: 98.6 F | SYSTOLIC BLOOD PRESSURE: 102 MMHG | OXYGEN SATURATION: 95 % | RESPIRATION RATE: 17 BRPM | HEART RATE: 71 BPM

## 2022-08-28 LAB
FLU A ANTIGEN: NEGATIVE
FLU B ANTIGEN: NEGATIVE
SARS-COV-2, RAPID: NOT DETECTED
SPECIMEN DESCRIPTION: NORMAL

## 2022-08-28 NOTE — ED PROVIDER NOTES
EMERGENCY DEPARTMENT ENCOUNTER    Pt Name: Henna Bonilla  MRN: [id number]  [place] 1960  Date of evaluation: 8/28/22  CHIEF COMPLAINT       Chief Complaint   Patient presents with    Shortness of Breath     General body aches x 1 week, no fever, but feels \"on fire\". H/A. Frequent couging. HISTORY OF PRESENT ILLNESS   Patient is a 82-year-old female with PMH of dextrocardia and cartilaginous syndrome who presents to the ED for evaluation of runny nose, nasal congestion, joint pain and cough. She has cough at baseline however it has gradually worsened over the past week. She also feels hot and has developed generalized joint pain. No fevers, chest pain, abdominal pain. She took home COVID test yesterday which was negative. REVIEW OF SYSTEMS     Review of Systems   All other systems reviewed and are negative. PASTMEDICAL HISTORY     Past Medical History:   Diagnosis Date    Bronchiectasis (HCC)     Chronic cough     Chronic nasal congestion     d/t kartaganer syndrome    Complete situs inversus with dextrocardia     Dextrocardia     Dr. Heather Sher    Hearing loss     no hearing aid at this time    HLD (hyperlipidemia)     Kartagener syndrome age 24    diagnosed age 24; bronchiectasis, chronic sinus congestion, situs inversus    Obesity     Osteoarthritis     Snores     denies apnea    Wears prescription eyeglasses     Wellness examination     SUSIE Davies-PCP [facility]     SURGICAL HISTORY       Past Surgical History:   Procedure Laterality Date    COLONOSCOPY      FOOT SURGERY      rt foot (hardware remains)    INNER EAR SURGERY      rt / reconstruction    KNEE SURGERY      bilateral    NASAL SEPTUM SURGERY      SLEEVE GASTRECTOMY  09/08/2020    LAPAROSCOPIC XI ROBOTIC GASTRECTOMY SLEEVE, LIVER BIOPSY, EGD     SLEEVE GASTRECTOMY N/A 9/8/2020    LAPAROSCOPIC XI ROBOTIC GASTRECTOMY SLEEVE, EGD performed by Keagan Valdez DO at [facility]      adenoids    UPPER GASTROINTESTINAL ENDOSCOPY N/A 2020    EGD BIOPSY performed by Perfecto Hoosk DO at [facility]       Previous Medications    CALCIUM CARBONATE ANTACID (TUMS PO)    Take by mouth    LINACLOTIDE (LINZESS) 72 MCG CAPS CAPSULE    Take 1 capsule by mouth every morning (before breakfast)    PRENATAL MV-MIN-FE FUM-FA-DHA (PRENATAL 1 PO)    Take by mouth    PROMETHAZINE (PHENERGAN) 25 MG TABLET    promethazine 25 mg tablet    TRIAMCINOLONE (KENALOG) 0.1 % CREAM    APPLY TO AFFECTED AREA TWICE DAILY    VITAMIN D (ERGOCALCIFEROL) 1.25 MG (06339 UT) CAPS CAPSULE    Take 1 capsule by mouth once a week for 8 doses     ALLERGIES     has No Known Allergies. FAMILY HISTORY     She indicated that her mother is . She indicated that her father is . She indicated that her maternal grandmother is . She indicated that the status of her paternal grandmother is unknown. SOCIAL HISTORY       Social History     Tobacco Use    Smoking status: Former     Types: Cigarettes     Quit date: 1993     Years since quittin.6    Smokeless tobacco: Never   Vaping Use    Vaping Use: Never used   Substance Use Topics    Alcohol use: Yes     Alcohol/week: 1.0 standard drink     Types: 1 Cans of beer per week     Comment: social    Drug use: No     PHYSICAL EXAM     INITIAL VITALS: BP (!) 102/55   Pulse 71   Temp 98.6 °F (37 °C) (Oral)   Resp 17   Ht 5' 6\" (1.676 m)   Wt 165 lb (74.8 kg)   LMP 2011   SpO2 95%   BMI 26.63 kg/m²    Physical Exam  HENT:      Head: Normocephalic. Right Ear: External ear normal.      Left Ear: External ear normal.      Nose: Nose normal.   Eyes:      Conjunctiva/sclera: Conjunctivae normal.   Cardiovascular:      Rate and Rhythm: Normal rate and regular rhythm. Heart sounds: Normal heart sounds. Pulmonary:      Effort: Pulmonary effort is normal.      Breath sounds: Normal breath sounds. Abdominal:      General: Abdomen is flat.    Skin:     General: Skin is dry.   Neurological:      Mental Status: She is alert. Mental status is at baseline. Psychiatric:         Mood and Affect: Mood normal.         Behavior: Behavior normal.       MEDICAL DECISION MAKING:   The patient is hemodynamically stable, afebrile, nontoxic-appearing. Physical exam unremarkable. Based on history and exam differential includes viral URI, pneumonia, COVID-19, influenza. ED plan for chest x-ray, rapid COVID, rapid influenza, reassess. DIAGNOSTIC RESULTS   EKG:All EKG's are interpreted by the Emergency Department Physician who either signs or Co-signs this chart in the absence of a cardiologist.        RADIOLOGY:All plain film, CT, MRI, and formal ultrasound images (except ED bedside ultrasound) are read by the radiologist, see reports below, unless otherwisenoted in MDM or here. XR CHEST PORTABLE   Final Result   1. No acute abnormality. 2. Dextrocardia. LABS: All lab results were reviewed by myself, and all abnormals are listed below. Labs Reviewed   COVID-19, RAPID   RAPID INFLUENZA A/B ANTIGENS   COVID-19       EMERGENCY DEPARTMENTCOURSE:   Patient did well in the ED. Negative rapid COVID. Negative influenza . Chest x-ray negative for infiltrate. Nonrapid COVID still pending. No further work-up indicated at this time. Nursing notes reviewed. At this time this is what I find, the patient appears well and does not appear sick or toxic. I gave my usual and customary discussion of the risks and benefits of discharge versus admission. I answered the patient's questions. I gave the patient strict return precautions. Patient expressed understanding of the discharge instructions. The care is provided during an unprecedented national emergency due to the novel coronavirus, COVID-19.       Vitals:    Vitals:    08/27/22 2253 08/27/22 2345 08/28/22 0001   BP: (!) 88/56 (!) 92/53 (!) 102/55   Pulse: 83 69 71   Resp: 18 19 17   Temp: 98.6 °F (37 °C)     TempSrc: Oral SpO2: 94% 94% 95%   Weight: 165 lb (74.8 kg)     Height: 5' 6\" (1.676 m)         The patient was given the following medications while in the emergency department:  No orders of the defined types were placed in this encounter. CONSULTS:  None    FINAL IMPRESSION      1. Myalgia    2.  Cough    3. Acute upper respiratory infection          DISPOSITION/PLAN   DISPOSITION Decision To Discharge 08/28/2022 12:34:27 AM      PATIENT REFERRED TO:  Andrew Grover  [address] [place] [address], [address]  [place] [address]  [phone]    In 2 days    DISCHARGE MEDICATIONS:  New Prescriptions    No medications on file     Patrick Gutierrez MD  Attending Emergency Physician                   Nisha Quesada MD  08/28/22 3951

## 2022-08-29 LAB
EKG ATRIAL RATE: 78 BPM
EKG P AXIS: 133 DEGREES
EKG P-R INTERVAL: 134 MS
EKG Q-T INTERVAL: 372 MS
EKG QRS DURATION: 90 MS
EKG QTC CALCULATION (BAZETT): 424 MS
EKG R AXIS: -124 DEGREES
EKG T AXIS: 134 DEGREES
EKG VENTRICULAR RATE: 78 BPM
SARS-COV-2: NORMAL
SARS-COV-2: NOT DETECTED
SOURCE: NORMAL

## 2022-08-29 PROCEDURE — 93010 ELECTROCARDIOGRAM REPORT: CPT | Performed by: INTERNAL MEDICINE

## 2022-09-08 ENCOUNTER — TELEPHONE (OUTPATIENT)
Dept: BARIATRICS/WEIGHT MGMT | Age: 62
End: 2022-09-08

## 2022-09-08 NOTE — TELEPHONE ENCOUNTER
Patient called requesting annual blood work order. Post op appointment scheduled for 9/28/22. She will be using [facility] to have blood work done and wanted to mention having cramping in feet at night and is curious about her blood sugar levels.

## 2022-09-09 DIAGNOSIS — R73.03 PREDIABETES: Primary | ICD-10-CM

## 2022-09-09 DIAGNOSIS — Z86.39 HISTORY OF ELEVATED LIPIDS: ICD-10-CM

## 2022-09-09 DIAGNOSIS — Z98.84 STATUS POST LAPAROSCOPIC SLEEVE GASTRECTOMY: ICD-10-CM

## 2022-09-09 DIAGNOSIS — R79.89 ELEVATED FERRITIN: ICD-10-CM

## 2022-09-09 DIAGNOSIS — E66.3 OVERWEIGHT (BMI 25.0-29.9): ICD-10-CM

## 2022-09-09 DIAGNOSIS — E55.9 VITAMIN D DEFICIENCY: ICD-10-CM

## 2022-09-28 ENCOUNTER — OFFICE VISIT (OUTPATIENT)
Dept: BARIATRICS/WEIGHT MGMT | Age: 62
End: 2022-09-28
Payer: COMMERCIAL

## 2022-09-28 VITALS
HEART RATE: 60 BPM | BODY MASS INDEX: 27.16 KG/M2 | WEIGHT: 169 LBS | HEIGHT: 66 IN | DIASTOLIC BLOOD PRESSURE: 60 MMHG | SYSTOLIC BLOOD PRESSURE: 100 MMHG

## 2022-09-28 DIAGNOSIS — Z90.3 INTESTINAL MALABSORPTION FOLLOWING GASTRECTOMY: Primary | ICD-10-CM

## 2022-09-28 DIAGNOSIS — K91.2 INTESTINAL MALABSORPTION FOLLOWING GASTRECTOMY: Primary | ICD-10-CM

## 2022-09-28 DIAGNOSIS — Z98.84 STATUS POST LAPAROSCOPIC SLEEVE GASTRECTOMY: ICD-10-CM

## 2022-09-28 PROCEDURE — 99213 OFFICE O/P EST LOW 20 MIN: CPT | Performed by: SURGERY

## 2022-10-07 NOTE — PROGRESS NOTES
[address] MIN INVASIVE BARIATRIC SURG  [address] [place] [address] [place]  [address] [facility] [address]  Dept: [phone]    SURGICAL WEIGHT LOSS MANAGEMENT PROGRAM  PROGRESS NOTE     CC: Weight Loss    Patient: Tawnya Rae      Service Date: 9/28/2022  Visit:   2 year   Medical Record #: [id number]  Date of Surgery:   9/9/2020    Reason for Visit: Routine Post-Operative:  [] New Problem /   [] FU of existing problem    Patient here for 2 year visit after sleeve gastrectomy. Doing well. No complaints, no GERD. No nausea, vomiting, fevers/chills. Taking vitamins. Weight loss over 100 lbs. She feels much improved over this 2 year period    Height: 5' 6\" (1.676 m)  Highest Weight:   305 lbs    Current Visit Weight Information  Weight: 169 lb (76.7 kg)   BMI: Body mass index is 27.28 kg/m². Weight loss since surgery:     136     [Labs to be drawn prior to 1m, 3m, 6m, 12m, 18m, and annual visits]    Liver pathology:    [] NA    [] No Gross path    [] Liver Steatosis   [] Discussed w/ pt   [] Referred to GI    Exercising?    [] Yes    [] No   Type: walking Frequency: daily    Review of Systems:     General  Negative for: [] Weight Change   [x] Fatigue   [x] Fevers & Chills    [] Appetite change [] Other:    Positive for: [] Weight Change   [] Fatigue   [] Fevers & Chills    [] Appetite change [] Other:   Cardiac  Negative for: [x] Chest Pain   [x] Difficulty Breathing   [] Leg Cramps [x] Edema of Lower Extremeties    [] Left   [] Right      Positive for: [] Chest Pain   [] Difficulty Breathing   [] Leg Cramps [] Edema of Lower Extremeties    [] Left   [] Right   Pulmonary  Negative for: [x] Shortness of Breath [] Wheeze [x] Cough  [] Calf Pain     Positive for: [] Shortness of Breath [] Wheeze [] Cough  [] Calf Pain   Gastro-Intestinal Negative for: [] Heartburn   [] Reflux   [] Dysphagia   [] Melena   [] BRBPR  [x] Vomiting   [x] Abdominal Pain   [] Diarrhea     [x] Constipation  [] Other:     Positive for: [] Heartburn   [] Reflux   [] Dysphagia   [] Melena   [] BRBPR  [] Vomiting   [] Abdominal Pain   [] Diarrhea     [] Constipation  [] Other:    Muskuloskeletal Negative for: [] Joint pain   [] Back pain   [] Knee Pain   [x] Muscle weakness [x] Hernia   [] Edema [] Other:     Positive for: [] Joint pain   [] Back pain   [] Knee Pain   [] Muscle weakness [] Hernia   [] Edema [] Other:    Neurologic Negative for: [x] Syncope   [x] Insomnia   [] Being treated for depression  [] Other:     Positive for: [] Syncope   [] Insomnia   [] Being treated for depression  [] Other:    Skin Negative for: [x] Wound:   [] Open   [] Draining   [] Incisional     [x] Rash   [] Hair Loss  [] Other:     Positive for: [] Wound:   [] Open   [] Draining    [] Incisional  [] Rash   [] Hair Loss  [] Other:          Physical Assessment:     /60 (Site: Right Upper Arm, Position: Sitting, Cuff Size: Large Adult)   Pulse 60   Ht 5' 6\" (1.676 m)   Wt 169 lb (76.7 kg)   LMP 06/17/2011   BMI 27.28 kg/m²   Constitutional:  Vital signs are normal. The patient appears well-developed and well-nourished. HEENT:   Head: Normocephalic. Atraumatic  Eyes: pupils are equal and reactive. No scleral icterus is present. Neck: No mass and no thyromegaly present. Cardiovascular: Normal rate, regular rhythm, S1 normal and S2 normal.  Radial pulses present   Pulmonary/Chest: Effort normal and breath sounds normal. No retractions  Abdominal: Soft. Normal appearance. There is no organomegaly. No tenderness. There is no rigidity, no rebound, no guarding and no Lopez's sign. Musculoskeletal:        Right lower leg: Normal. No tenderness and no edema. Left lower leg: Normal. No tenderness and no edema. Lymphadenopathy:     No cervical adenopathy, No Exrtemity Adenopathy. Neurological: The patient is alert and oriented.  Moving all 4 extremities, sensation grossly intact bilateral  Skin: Skin is warm, dry and intact. Psychiatric: The patient has a normal mood and affect. Speech is normal and behavior is normal. Judgment and thought content normal. Cognition and memory are normal.     Assessment & Plan:      1. Intestinal malabsorption following gastrectomy    2. Status post laparoscopic sleeve gastrectomy            [x] Advance Diet    [x] Daily protein (65-75gm/day)   [x] Take Vitamins   [x] Attend Support Group    [x] Exercise Regularly     Malabsorption post gastrectomy  Vitamin replacement discussed  Protein intake discussed    Status post sleeve gastrectomy  She has done very well  Exercise 30 minutes daily, add resistance exercises  Yearly follow up discussed  She has no GERD at this point    Follow up in 2 month  Follow up: No follow-ups on file. Orders placed this encounter:   No orders of the defined types were placed in this encounter. New Prescriptions:   No orders of the defined types were placed in this encounter. Electronically signed by Lynn Ribeiro DO on 10/6/2022 at 10:01 PM    Please note that this chart was generated using voice recognition Dragon dictation software. Although every effort was made to ensure the accuracy of this automated transcription, some errors in transcription may have occurred.

## 2023-07-21 ENCOUNTER — HOSPITAL ENCOUNTER (OUTPATIENT)
Dept: PREADMISSION TESTING | Age: 63
End: 2023-07-21
Payer: COMMERCIAL

## 2023-07-21 VITALS
SYSTOLIC BLOOD PRESSURE: 114 MMHG | BODY MASS INDEX: 27.78 KG/M2 | DIASTOLIC BLOOD PRESSURE: 45 MMHG | HEIGHT: 67 IN | RESPIRATION RATE: 20 BRPM | WEIGHT: 177 LBS | HEART RATE: 42 BPM | OXYGEN SATURATION: 97 % | TEMPERATURE: 97.1 F

## 2023-07-21 LAB
ANION GAP SERPL CALCULATED.3IONS-SCNC: 8 MMOL/L (ref 9–17)
BUN SERPL-MCNC: 13 MG/DL (ref 8–23)
BUN/CREAT SERPL: 22 (ref 9–20)
CALCIUM SERPL-MCNC: 9.2 MG/DL (ref 8.6–10.4)
CHLORIDE SERPL-SCNC: 104 MMOL/L (ref 98–107)
CO2 SERPL-SCNC: 29 MMOL/L (ref 20–31)
CREAT SERPL-MCNC: 0.6 MG/DL (ref 0.5–0.9)
ERYTHROCYTE [DISTWIDTH] IN BLOOD BY AUTOMATED COUNT: 12.7 % (ref 11.8–14.4)
GFR SERPL CREATININE-BSD FRML MDRD: >60 ML/MIN/1.73M2
GLUCOSE SERPL-MCNC: 85 MG/DL (ref 70–99)
HCT VFR BLD AUTO: 41.1 % (ref 36.3–47.1)
HGB BLD-MCNC: 13 G/DL (ref 11.9–15.1)
MCH RBC QN AUTO: 30 PG (ref 25.2–33.5)
MCHC RBC AUTO-ENTMCNC: 31.6 G/DL (ref 28.4–34.8)
MCV RBC AUTO: 94.9 FL (ref 82.6–102.9)
NRBC BLD-RTO: 0 PER 100 WBC
PLATELET # BLD AUTO: 260 K/UL (ref 138–453)
PMV BLD AUTO: 9.8 FL (ref 8.1–13.5)
POTASSIUM SERPL-SCNC: 4.6 MMOL/L (ref 3.7–5.3)
RBC # BLD AUTO: 4.33 M/UL (ref 3.95–5.11)
SODIUM SERPL-SCNC: 141 MMOL/L (ref 135–144)
WBC OTHER # BLD: 7.7 K/UL (ref 3.5–11.3)

## 2023-07-21 PROCEDURE — 36415 COLL VENOUS BLD VENIPUNCTURE: CPT

## 2023-07-21 PROCEDURE — 85027 COMPLETE CBC AUTOMATED: CPT

## 2023-07-21 PROCEDURE — 93005 ELECTROCARDIOGRAM TRACING: CPT | Performed by: ANESTHESIOLOGY

## 2023-07-21 PROCEDURE — 80048 BASIC METABOLIC PNL TOTAL CA: CPT

## 2023-07-21 RX ORDER — CALCIUM CARBONATE 500(1250)
500 TABLET ORAL DAILY
COMMUNITY

## 2023-07-21 ASSESSMENT — PAIN DESCRIPTION - LOCATION: LOCATION: FOOT

## 2023-07-21 ASSESSMENT — PAIN DESCRIPTION - ORIENTATION: ORIENTATION: RIGHT

## 2023-07-21 ASSESSMENT — PAIN SCALES - GENERAL: PAINLEVEL_OUTOF10: 3

## 2023-07-21 ASSESSMENT — PAIN DESCRIPTION - DESCRIPTORS: DESCRIPTORS: BURNING

## 2023-07-21 NOTE — PRE-PROCEDURE INSTRUCTIONS
washing before surgery. Shower the night before and the morning of surgery using the instructions below. If you are allergic to Chlorhexidine Gluconate (CHG) use antibacterial soap instead. If you were given Chlorhexidine soap, please follow the instructions included with the soap to shower the night before and the morning of surgery. If you were not given Chlorhexidine, please shower or bathe the morning of surgery using an antibacterial soap. Please dress in clean clothing after showering. General information about Chlorhexidine Gluconate (CHG)   * It should not be used on hair, face, ears, genital area or skin that is not intact. * It should not be used if breast feeding. * It should not be used if you allergic to CHG. * See the bottle for additional information. Do Not apply any powder, deodorant, lotion/cream/oil, perfume/aftershave, cosmetics, or alcohol-based skin or hair products after showering. Do Not shave near the planned surgical site unless specifically instructed to.     1. Wash your hair using your normal shampoo and rinse. 2. Wash your face and genital area (privates) using your own shampoo and rinse. 3. Turn off the shower water and pour half of the bottle of CHG onto a clean washcloth. 4. Wash your body from neck down to toes. Pay special attention to your surgical site. 5. Leave the CHG on your skin for 5 minutes and rinse it off.   6. Pat dry with a clean towel, sleep in clean clothes and clean sheets. 7. Do not shave near the surgical site. 8. Repeat process the morning of surgery. CHG may cause dry skin, but should not cause redness, rash, or intense itching. If an suspect an allergic reaction, use your own soap and shampoo for the morning of surgery and report reaction to the pre-operative nurse. Additional Instructions:      1. If you are having any type of anesthesia, you are to have NOTHING to eat or drink after midnight the night before surgery. This includes no gum, hard candy, mints or water. The only exception to this is small sips of water to take the medications listed above. No smoking or chewing tobacco after midnight. No alcoholic beverages for 24 hours prior to surgery. 2. You may brush your teeth but do not swallow the water. 3. If you wear glasses bring a case for them if you have one. No contacts should be worn the day of surgery. You may also bring your hearing aids. If you have dentures, most surgical procedures involving anesthesia will require that you remove them prior to surgery. 4. If you sleep with a CPAP or BiPAP machine at home and plan on staying in the hospital overnight after surgery, please bring your machine with you. 5. Do not wear any jewelry or body piercings the day of surgery. No nail polish on the operative extremity (arm/hand or leg/foot surgeries)   6. If you are staying overnight with us, you may bring a small bag of necessary personal items. 7. Please wear loose, comfortable clothing. If you are potentially going to have a cast, sling, brace or bulky dressing, make sure to wear clothing that will fit over it. 8. In case of illness - If you have cold or flu like symptoms (high fever, runny nose, sore throat, cough, etc.) rash, nausea, vomiting, loose stools, and/or recent contact with someone who has a contagious disease (chicken pox, measles, COVID-19, etc.). Please call your surgeon before coming to the hospital.    Transportation After Your Surgery/Procedure: If you are going home the same day of surgery you need someone to drive you home. Your  must be at least 25years of age. A taxi cab or other nonmedical public transportation is not acceptable unless you have someone to ride home in the vehicle with you. For your safety, someone must remain with you for the first 24 hours after your surgery if you receive anesthesia or medication.   If you do not have someone to stay with you, your

## 2023-07-21 NOTE — PROGRESS NOTES
Pt known to have Dextrocardia. EKG done with regular placement of leads then leads placed on right for 2nd interpretation. Dr Raul Go shown pt's EKG reading, no further testing needed.

## 2023-07-22 LAB
EKG ATRIAL RATE: 47 BPM
EKG P AXIS: 114 DEGREES
EKG P-R INTERVAL: 142 MS
EKG Q-T INTERVAL: 434 MS
EKG QRS DURATION: 82 MS
EKG QTC CALCULATION (BAZETT): 384 MS
EKG R AXIS: -138 DEGREES
EKG T AXIS: 130 DEGREES
EKG VENTRICULAR RATE: 47 BPM

## 2023-07-24 ENCOUNTER — ANESTHESIA EVENT (OUTPATIENT)
Dept: OPERATING ROOM | Age: 63
End: 2023-07-24
Payer: COMMERCIAL

## 2023-07-24 NOTE — FLOWSHEET NOTE
07/24/23 1557   Anesthesia PAT Clearance   Anesthesia Review Status Anes has reviewed patient for surgery   Is the patient cleared? Clearance needed  (Dr. Fransico Garcia reviewed EKG and medical hx (pt. has Kartagener syndrome). Requesting medical clearance as well as cardiac clearance d/t EKG changes.  Notified Ayla Mcguire at Dr. Corean Cockayne office.)

## 2023-08-04 ENCOUNTER — ANESTHESIA (OUTPATIENT)
Dept: OPERATING ROOM | Age: 63
End: 2023-08-04
Payer: COMMERCIAL

## 2023-08-04 ENCOUNTER — HOSPITAL ENCOUNTER (OUTPATIENT)
Age: 63
Setting detail: OUTPATIENT SURGERY
Discharge: HOME | End: 2023-08-04
Attending: PODIATRIST | Admitting: PODIATRIST
Payer: COMMERCIAL

## 2023-08-04 ENCOUNTER — APPOINTMENT (OUTPATIENT)
Dept: GENERAL RADIOLOGY | Age: 63
End: 2023-08-04
Attending: PODIATRIST
Payer: COMMERCIAL

## 2023-08-04 VITALS
RESPIRATION RATE: 10 BRPM | OXYGEN SATURATION: 98 % | TEMPERATURE: 96.8 F | DIASTOLIC BLOOD PRESSURE: 68 MMHG | HEIGHT: 67 IN | HEART RATE: 46 BPM | WEIGHT: 177 LBS | SYSTOLIC BLOOD PRESSURE: 119 MMHG | BODY MASS INDEX: 27.78 KG/M2

## 2023-08-04 DIAGNOSIS — G89.18 POST-OP PAIN: Primary | ICD-10-CM

## 2023-08-04 PROCEDURE — 3600000002 HC SURGERY LEVEL 2 BASE: Performed by: PODIATRIST

## 2023-08-04 PROCEDURE — 2709999900 HC NON-CHARGEABLE SUPPLY: Performed by: PODIATRIST

## 2023-08-04 PROCEDURE — 2500000003 HC RX 250 WO HCPCS

## 2023-08-04 PROCEDURE — 7100000010 HC PHASE II RECOVERY - FIRST 15 MIN: Performed by: PODIATRIST

## 2023-08-04 PROCEDURE — 2500000003 HC RX 250 WO HCPCS: Performed by: PODIATRIST

## 2023-08-04 PROCEDURE — 3700000000 HC ANESTHESIA ATTENDED CARE: Performed by: PODIATRIST

## 2023-08-04 PROCEDURE — C1769 GUIDE WIRE: HCPCS | Performed by: PODIATRIST

## 2023-08-04 PROCEDURE — 6360000002 HC RX W HCPCS: Performed by: PODIATRIST

## 2023-08-04 PROCEDURE — 6360000002 HC RX W HCPCS

## 2023-08-04 PROCEDURE — 2720000010 HC SURG SUPPLY STERILE: Performed by: PODIATRIST

## 2023-08-04 PROCEDURE — 6360000002 HC RX W HCPCS: Performed by: ANESTHESIOLOGY

## 2023-08-04 PROCEDURE — 6370000000 HC RX 637 (ALT 250 FOR IP): Performed by: ANESTHESIOLOGY

## 2023-08-04 PROCEDURE — 3600000012 HC SURGERY LEVEL 2 ADDTL 15MIN: Performed by: PODIATRIST

## 2023-08-04 PROCEDURE — C1713 ANCHOR/SCREW BN/BN,TIS/BN: HCPCS | Performed by: PODIATRIST

## 2023-08-04 PROCEDURE — 2580000003 HC RX 258: Performed by: ANESTHESIOLOGY

## 2023-08-04 PROCEDURE — 7100000011 HC PHASE II RECOVERY - ADDTL 15 MIN: Performed by: PODIATRIST

## 2023-08-04 PROCEDURE — 3700000001 HC ADD 15 MINUTES (ANESTHESIA): Performed by: PODIATRIST

## 2023-08-04 DEVICE — SCREW BNE L18MM DIA2.5MM MIC FT ANK TI SELF DRL ST CANN: Type: IMPLANTABLE DEVICE | Site: FOOT | Status: FUNCTIONAL

## 2023-08-04 DEVICE — SCREW BNE L20MM DIA2.5MM MIC FT ANK TI SELF DRL ST CANN: Type: IMPLANTABLE DEVICE | Site: FOOT | Status: FUNCTIONAL

## 2023-08-04 RX ORDER — PROPOFOL 10 MG/ML
INJECTION, EMULSION INTRAVENOUS PRN
Status: DISCONTINUED | OUTPATIENT
Start: 2023-08-04 | End: 2023-08-04 | Stop reason: SDUPTHER

## 2023-08-04 RX ORDER — SODIUM CHLORIDE 9 MG/ML
INJECTION, SOLUTION INTRAVENOUS CONTINUOUS
Status: DISCONTINUED | OUTPATIENT
Start: 2023-08-04 | End: 2023-08-04 | Stop reason: HOSPADM

## 2023-08-04 RX ORDER — HYDROCODONE BITARTRATE AND ACETAMINOPHEN 5; 325 MG/1; MG/1
1 TABLET ORAL EVERY 6 HOURS PRN
Qty: 20 TABLET | Refills: 0 | Status: SHIPPED | OUTPATIENT
Start: 2023-08-04 | End: 2023-08-09

## 2023-08-04 RX ORDER — MIDAZOLAM HYDROCHLORIDE 1 MG/ML
INJECTION INTRAMUSCULAR; INTRAVENOUS PRN
Status: DISCONTINUED | OUTPATIENT
Start: 2023-08-04 | End: 2023-08-04 | Stop reason: SDUPTHER

## 2023-08-04 RX ORDER — SODIUM CHLORIDE 0.9 % (FLUSH) 0.9 %
5-40 SYRINGE (ML) INJECTION PRN
Status: DISCONTINUED | OUTPATIENT
Start: 2023-08-04 | End: 2023-08-04 | Stop reason: HOSPADM

## 2023-08-04 RX ORDER — ONDANSETRON 2 MG/ML
4 INJECTION INTRAMUSCULAR; INTRAVENOUS
Status: COMPLETED | OUTPATIENT
Start: 2023-08-04 | End: 2023-08-04

## 2023-08-04 RX ORDER — HYDROCODONE BITARTRATE AND ACETAMINOPHEN 5; 325 MG/1; MG/1
1 TABLET ORAL ONCE
Status: COMPLETED | OUTPATIENT
Start: 2023-08-04 | End: 2023-08-04

## 2023-08-04 RX ORDER — EPHEDRINE SULFATE/0.9% NACL/PF 50 MG/5 ML
SYRINGE (ML) INTRAVENOUS PRN
Status: DISCONTINUED | OUTPATIENT
Start: 2023-08-04 | End: 2023-08-04 | Stop reason: SDUPTHER

## 2023-08-04 RX ORDER — LIDOCAINE HYDROCHLORIDE 10 MG/ML
1 INJECTION, SOLUTION EPIDURAL; INFILTRATION; INTRACAUDAL; PERINEURAL
Status: DISCONTINUED | OUTPATIENT
Start: 2023-08-05 | End: 2023-08-04 | Stop reason: HOSPADM

## 2023-08-04 RX ORDER — FENTANYL CITRATE 50 UG/ML
INJECTION, SOLUTION INTRAMUSCULAR; INTRAVENOUS
Status: COMPLETED
Start: 2023-08-04 | End: 2023-08-04

## 2023-08-04 RX ORDER — SODIUM CHLORIDE 0.9 % (FLUSH) 0.9 %
5-40 SYRINGE (ML) INJECTION EVERY 12 HOURS SCHEDULED
Status: DISCONTINUED | OUTPATIENT
Start: 2023-08-04 | End: 2023-08-04 | Stop reason: HOSPADM

## 2023-08-04 RX ORDER — SODIUM CHLORIDE, SODIUM LACTATE, POTASSIUM CHLORIDE, CALCIUM CHLORIDE 600; 310; 30; 20 MG/100ML; MG/100ML; MG/100ML; MG/100ML
INJECTION, SOLUTION INTRAVENOUS CONTINUOUS
Status: DISCONTINUED | OUTPATIENT
Start: 2023-08-04 | End: 2023-08-04 | Stop reason: HOSPADM

## 2023-08-04 RX ORDER — SODIUM CHLORIDE 9 MG/ML
INJECTION, SOLUTION INTRAVENOUS PRN
Status: DISCONTINUED | OUTPATIENT
Start: 2023-08-04 | End: 2023-08-04 | Stop reason: HOSPADM

## 2023-08-04 RX ORDER — FENTANYL CITRATE 50 UG/ML
INJECTION, SOLUTION INTRAMUSCULAR; INTRAVENOUS PRN
Status: DISCONTINUED | OUTPATIENT
Start: 2023-08-04 | End: 2023-08-04 | Stop reason: SDUPTHER

## 2023-08-04 RX ORDER — KETAMINE HCL IN NACL, ISO-OSM 100MG/10ML
SYRINGE (ML) INJECTION PRN
Status: DISCONTINUED | OUTPATIENT
Start: 2023-08-04 | End: 2023-08-04 | Stop reason: SDUPTHER

## 2023-08-04 RX ORDER — LIDOCAINE HYDROCHLORIDE 20 MG/ML
INJECTION, SOLUTION EPIDURAL; INFILTRATION; INTRACAUDAL; PERINEURAL PRN
Status: DISCONTINUED | OUTPATIENT
Start: 2023-08-04 | End: 2023-08-04 | Stop reason: SDUPTHER

## 2023-08-04 RX ORDER — FENTANYL CITRATE 50 UG/ML
25 INJECTION, SOLUTION INTRAMUSCULAR; INTRAVENOUS EVERY 5 MIN PRN
Status: DISCONTINUED | OUTPATIENT
Start: 2023-08-04 | End: 2023-08-04 | Stop reason: HOSPADM

## 2023-08-04 RX ORDER — HYDROMORPHONE HYDROCHLORIDE 1 MG/ML
0.5 INJECTION, SOLUTION INTRAMUSCULAR; INTRAVENOUS; SUBCUTANEOUS EVERY 5 MIN PRN
Status: DISCONTINUED | OUTPATIENT
Start: 2023-08-04 | End: 2023-08-04 | Stop reason: HOSPADM

## 2023-08-04 RX ADMIN — Medication 10 MG: at 08:55

## 2023-08-04 RX ADMIN — PROPOFOL 125 MCG/KG/MIN: 10 INJECTION, EMULSION INTRAVENOUS at 08:14

## 2023-08-04 RX ADMIN — FENTANYL CITRATE 25 MCG: 50 INJECTION INTRAMUSCULAR; INTRAVENOUS at 08:13

## 2023-08-04 RX ADMIN — Medication 5 MG: at 08:50

## 2023-08-04 RX ADMIN — Medication 10 MG: at 09:28

## 2023-08-04 RX ADMIN — Medication 10 MG: at 09:01

## 2023-08-04 RX ADMIN — FENTANYL CITRATE 25 MCG: 50 INJECTION INTRAMUSCULAR; INTRAVENOUS at 10:05

## 2023-08-04 RX ADMIN — Medication 5 MG: at 08:42

## 2023-08-04 RX ADMIN — Medication 10 MG: at 09:07

## 2023-08-04 RX ADMIN — Medication 5 MG: at 09:23

## 2023-08-04 RX ADMIN — SODIUM CHLORIDE, POTASSIUM CHLORIDE, SODIUM LACTATE AND CALCIUM CHLORIDE: 600; 310; 30; 20 INJECTION, SOLUTION INTRAVENOUS at 07:35

## 2023-08-04 RX ADMIN — HYDROCODONE BITARTRATE AND ACETAMINOPHEN 1 TABLET: 5; 325 TABLET ORAL at 10:50

## 2023-08-04 RX ADMIN — Medication 5 MG: at 09:15

## 2023-08-04 RX ADMIN — Medication 10 MG: at 08:19

## 2023-08-04 RX ADMIN — ONDANSETRON 4 MG: 2 INJECTION INTRAMUSCULAR; INTRAVENOUS at 10:35

## 2023-08-04 RX ADMIN — FENTANYL CITRATE 25 MCG: 50 INJECTION INTRAMUSCULAR; INTRAVENOUS at 10:15

## 2023-08-04 RX ADMIN — MIDAZOLAM 2 MG: 1 INJECTION INTRAMUSCULAR; INTRAVENOUS at 08:09

## 2023-08-04 RX ADMIN — FENTANYL CITRATE 25 MCG: 50 INJECTION INTRAMUSCULAR; INTRAVENOUS at 08:55

## 2023-08-04 RX ADMIN — LIDOCAINE HYDROCHLORIDE 60 MG: 20 INJECTION, SOLUTION EPIDURAL; INFILTRATION; INTRACAUDAL; PERINEURAL at 08:13

## 2023-08-04 RX ADMIN — Medication 2000 MG: at 08:23

## 2023-08-04 RX ADMIN — PROPOFOL 100 MCG/KG/MIN: 10 INJECTION, EMULSION INTRAVENOUS at 08:16

## 2023-08-04 RX ADMIN — FENTANYL CITRATE 25 MCG: 50 INJECTION INTRAMUSCULAR; INTRAVENOUS at 09:01

## 2023-08-04 RX ADMIN — PROPOFOL 75 MCG/KG/MIN: 10 INJECTION, EMULSION INTRAVENOUS at 08:19

## 2023-08-04 RX ADMIN — Medication 5 MG: at 09:28

## 2023-08-04 RX ADMIN — FENTANYL CITRATE 25 MCG: 50 INJECTION INTRAMUSCULAR; INTRAVENOUS at 08:33

## 2023-08-04 ASSESSMENT — PAIN SCALES - GENERAL
PAINLEVEL_OUTOF10: 6
PAINLEVEL_OUTOF10: 5

## 2023-08-04 ASSESSMENT — PAIN DESCRIPTION - ORIENTATION: ORIENTATION: RIGHT

## 2023-08-04 ASSESSMENT — PAIN - FUNCTIONAL ASSESSMENT: PAIN_FUNCTIONAL_ASSESSMENT: 0-10

## 2023-08-04 ASSESSMENT — PAIN DESCRIPTION - LOCATION: LOCATION: FOOT

## 2023-08-04 NOTE — H&P
CC: painful hardware right 1st metatarsal, bunion right foot, contracted toes right foot    HPI: 66year old female with history of painful hallux valgus, hardware and contracted 2,3 digits. Had exhausted conservative measures and is electing for surgical intervention. Risks/benefits reviewed. Past Medical History:   Diagnosis Date    Bronchiectasis (HCC)     Chronic cough     Chronic nasal congestion     d/t kartaganer syndrome    Complete situs inversus with dextrocardia     Dextrocardia     Dr. Nahum Garcia    Hearing loss     no hearing aid at this time    HLD (hyperlipidemia)     Kartagener syndrome age 24    diagnosed age 24; bronchiectasis, chronic sinus congestion, situs inversus    Obesity     Osteoarthritis     Snores     denies apnea    Wears prescription eyeglasses     Wellness examination     SUSIE Davies-PCP [facility]     Past Surgical History:   Procedure Laterality Date    COLONOSCOPY      EAR SURGERY Left     [facility]    FOOT SURGERY      rt foot (hardware remains)    INNER EAR SURGERY      rt / reconstruction    KNEE SURGERY      bilateral    NASAL SEPTUM SURGERY      SLEEVE GASTRECTOMY  2020    LAPAROSCOPIC XI ROBOTIC GASTRECTOMY SLEEVE, LIVER BIOPSY, EGD     SLEEVE GASTRECTOMY N/A 2020    LAPAROSCOPIC XI ROBOTIC GASTRECTOMY SLEEVE, EGD performed by Denton Lewis DO at [address]      adenoids    UPPER GASTROINTESTINAL ENDOSCOPY N/A 2020    EGD BIOPSY performed by Denton Lewis DO at [address] History     Socioeconomic History    Marital status:      Spouse name: Not on file    Number of children: Not on file    Years of education: Not on file    Highest education level: Not on file   Occupational History    Not on file   Tobacco Use    Smoking status: Former     Types: Cigarettes     Quit date: 1993     Years since quittin.6    Smokeless tobacco: Never   Vaping Use    Vaping Use: Never used   Substance and Sexual Activity    Alcohol use: Yes     Alcohol/week: 1.0 standard drink     Types: 1 Cans of beer per week     Comment: social    Drug use: No    Sexual activity: Not on file   Other Topics Concern    Not on file   Social History Narrative    Not on file     Social Determinants of Health     Financial Resource Strain: Not on file   Food Insecurity: Not on file   Transportation Needs: Not on file   Physical Activity: Not on file   Stress: Not on file   Social Connections: Not on file   Intimate Partner Violence: Not on file   Housing Stability: Not on file     No current facility-administered medications on file prior to encounter. Current Outpatient Medications on File Prior to Encounter   Medication Sig Dispense Refill    vitamin D (ERGOCALCIFEROL) 1.25 MG (40920 UT) CAPS capsule Take 1 capsule by mouth once a week for 8 doses 8 capsule 0    Prenatal MV-Min-Fe Fum-FA-DHA (PRENATAL 1 PO) Take by mouth      triamcinolone (KENALOG) 0.1 % cream        No Known Allergies    Physical exam:  Alert, oriented x 3. NAD  RRR, Lungs CTA  Right foot exam:  +DP/PT 2/4. CFT < 3 secs. Sensation grossly intact. Painful palpable hardware right 1st metatarsal  Lateral deviation hallux, good ROM 1st MPJ  Lateral deviation 2,3 digits. Skin warm dry intact.     Assessment:  Painful hardware right 1st metatarsal  Hallux valgus right foot  Contracted 2,3 digits right foot    Plan:  Removal of hardware  Right hallux osteotomy  Right 2,3 toe osteotomy

## 2023-08-04 NOTE — DISCHARGE INSTRUCTIONS
Foot and Ankle Surgery Post Operative Instructions: You have had a surgical procedure on your left foot. Fluids and Diet:  Begin with clear liquids, broth, dry toast, and crackers. If not nauseated then resume your regular pre-operative diet when you are ready  . Medications: Take your prescriptions as directed  You are receiving new prescriptions for Norco  You received nerve block to the foot-this should manage your pain for the first 18hrs post operatively  If your pain is not severe then you may take the non-prescription medication that you normally take for aches and pains ie Tylenol and Ibuprofen (alternating), or if severe pain occurs these will serve as additional medication in conjuction with the [address] [place] [address] [place] may resume your regularly scheduled medications (unless otherwise directed)  If any side effects or adverse reactions occur, discontinue the medication and contact your doctor. Review the patient drug information that is provided before you take any medication    Ambulation and Activity:  You are advised to go directly home from the hospital  Use none as needed  We recommend knee scooter if possible, you can also obtain these on [address] [place] [address] for rather affordable and quickly obtainable. You may not put weight on the operated foot. You should wear the surgical shoe  at all times when awake. Do not walk on the right foot. Avoid stairs.   Do not lift or move heavy objects  Do not drive until cleared by your physician    Bandage and Wound Care Instructions:  Keep bandage clean and dry  Do NOT remove dressing/ splint  DO NOT get wet  Please use shower cover around leg if you do shower so that dressing does not get wet  Do not shower or bathe the operative extremity  Do not remove the bandage (unless otherwise directed)   Do not attempt to put anything between the cast or dressing and your skin, some itching is normal.    Ice and Elevation:   Elevate operative extremity as much as possible to

## 2023-08-04 NOTE — ANESTHESIA PRE PROCEDURE
increased (35-41 mL/m2). Interatrial Septum No interatrial shunt visualized with color Doppler. Right Ventricle Right ventricle size is normal. Normal systolic function. Right Atrium Right atrium size is normal.  Aortic Valve Valve structure is normal. No regurgitation. No stenosis. Mitral Valve Valve structure is normal. Mild regurgitation. No stenosis noted. Tricuspid Valve Valve structure is normal. Trace regurgitation. No stenosis noted. The estimated RVSP is 37 mmHg. Pulmonic Valve Valve structure is normal. No regurgitation. No stenosis noted. Aorta Normal sized aortic root and ascending aorta. IVC/Hepatic Veins IVC diameter is less than or equal to 21 mm and decreases greater than 50% during inspiration; therefore the estimated right atrial pressure is normal (~3 mmHg). IVC size is normal.  Pericardium No pericardial effusion. Neuro/Psych:               GI/Hepatic/Renal:        (-) GERD       Endo/Other:    (+) : arthritis:., .                  ROS comment: Kartagener's syndrome Abdominal:             Vascular: Other Findings:             Anesthesia Plan      general and TIVA     ASA 2       Induction: intravenous. MIPS: Postoperative opioids intended and Prophylactic antiemetics administered. Anesthetic plan and risks discussed with patient.         Attending anesthesiologist reviewed and agrees with Rachelle Stock MD   8/4/2023

## 2023-08-04 NOTE — BRIEF OP NOTE
Brief Postoperative Note      Patient: Beryle Marsh  YOB: 1960  MRN: [id number]    Date of Procedure: 8/4/2023    Pre-Op Diagnosis Codes:     * Hallux valgus (acquired), right foot [M20.11]     * Painful orthopaedic hardware ([address]) [T84.84XA]  Contracture right 2,3 toes. Post-Op Diagnosis: Same       Procedure(s):  REMOVAL HARDWARE RIGHT FIRST METATARSAL, BUNIONECTOMY RIGHT FOOT WITH OSTEOTOMY RIGHT 2ND AND 3RD TOES    Surgeon(s):  Sher Dawn DPM    Assistant:  First Assistant: Joan Burnette RN  Resident: Derrick Terry DPM    Anesthesia: Monitor Anesthesia Care    Estimated Blood Loss (mL): Minimal      Injectables: 15 ml of a 1:1 racemic mixture of 0.5% marcaine plain and 1% lidocaine plain. Hemostasis: Pneumatic ankle tourniquet at 250 mmhg for 45 minutes. Complications: None    Specimens:   * No specimens in log *    Implants:  Implant Name Type Inv.  Item Serial No.  Lot No. LRB No. Used Action   HEADLESS SEVERINO SCRW 2.5X18MM - [id number]  HEADLESS SEVERINO SCRW 2.5X18MM  [employer]  Right 1 Implanted   SCREW BONE L20MM OD25MM CANNULATED COMPRESSION HEADLESS - [id number]  SCREW BONE L20MM OD25MM CANNULATED COMPRESSION HEADLESS  [employer]  Right 1 Implanted         Drains: * No LDAs found *    Findings: see op note for details      Electronically signed by Derrick Terry DPM on 8/4/2023 at 9:58 AM

## 2023-08-04 NOTE — ANESTHESIA POSTPROCEDURE EVALUATION
Department of Anesthesiology  Postprocedure Note    Patient: Barbara Herman  MRN: [id number]  YOB: 1960  Date of evaluation: 8/4/2023      Procedure Summary     Date: 08/04/23 [facility] [place] [facility]    Anesthesia Start: [address] [facility] Anesthesia Stop: 6718    Procedure: REMOVAL HARDWARE RIGHT FIRST METATARSAL, BUNIONECTOMY RIGHT FOOT WITH OSTEOTOMY RIGHT 2ND AND 3RD TOES (Right: Foot) Diagnosis:       Hallux valgus (acquired), right foot      Painful orthopaedic hardware ([address])      (Hallux valgus (acquired), right foot [M20.11])      (Painful orthopaedic hardware [facility]) [V61.82MI])    Surgeons: Soraida Helton DPM Responsible Provider: Jhonatan Hill MD    Anesthesia Type: general, TIVA ASA Status: 2          Anesthesia Type: No value filed.     Taran Phase I:      Taran Phase II: Taran Score: 8      Anesthesia Post Evaluation    Patient location during evaluation: PACU  Patient participation: complete - patient participated  Level of consciousness: awake  Airway patency: patent  Nausea & Vomiting: no nausea  Complications: no  Cardiovascular status: blood pressure returned to baseline  Respiratory status: acceptable  Hydration status: euvolemic  Comments: Multimodal analgesia pain management as indicated by procedure  Multimodal analgesia pain management approach  Pain management: adequate

## 2023-08-05 NOTE — OP NOTE
[address] [place] [address] Dr                [address], [address] [facility]                                OPERATIVE REPORT    PATIENT NAME: Zahida Nicholson                    :        1960  MED REC NO:   [id number]                             ROOM:  ACCOUNT NO:   [de-identified]                           ADMIT DATE: 2023  PROVIDER:     Josie Ordoñez    DATE OF PROCEDURE:  2023    PREOPERATIVE DIAGNOSES:  1. Hallux valgus, right foot. 2.  Painful orthopedic hardware, right first metatarsal.  3.  Angular deformity contracted second and third toe, right foot. POSTOPERATIVE DIAGNOSES:  1. Hallux valgus, right foot. 2.  Painful orthopedic hardware, right first metatarsal.  3.  Angular deformity contracted second and third toe, right foot. OPERATION PERFORMED:  1. Right foot bunionectomy with proximal phalanx osteotomy. 2.  Removal of hardware, right first metatarsal.  3.  Osteotomy, right second and third toe. SURGEON:  Josie Ordoñez DPM    ASSISTANT SURGEON:  Jhon Moncada DPM, PGY-2    ANESTHESIA:  Local with IV sedation. HEMOSTASIS:  Pneumatic ankle tourniquet at 250 mmHg. ESTIMATED BLOOD LOSS:  Less than 5 mL. IMPLANTS:  Arthrex 2.5-mm fully threaded cannulated screw x1, 0.062  K-wire x2. INJECTABLES:  15 mL of 1:1 solution of 1% lidocaine plain and 0.5%  Marcaine plain. COMPLICATIONS:  None apparent. PATHOLOGY:  None. INDICATIONS FOR PROCEDURE:  This is a 61-year-old female with history of  previous right foot bunionectomy with K-wire fixation. The patient  presented to my office with complaint of recurrent bunion deformity. We  had a lengthy discussion regarding treatment options. She also was  complaining of laterally deviated right second and third digits. Preoperative x-rays were obtained. We had a lengthy discussion  regarding surgical treatment options. Informed consent was obtained  from the patient.   No guarantees were given or implied regarding  surgical outcome. PREOPERATIVE DETAILS:  Under mild sedation, the patient was brought back  to the operating room and placed on OR table in the supine position. IV  sedation was administered. A well-padded pneumatic ankle tourniquet was  applied to the right ankle. The right foot was then anesthetized with  15 mL of a 1:1 solution of 1% lidocaine plain and 0.5% Marcaine plain. The right foot was then prepped and draped in usual aseptic fashion. Time-out was performed to confirm the correct patient, correct site,  correct procedure. Everyone in the room was in agreement. PROCEDURE IN DETAIL:  Sterile Esmarch was utilized to exsanguinate the  right foot. Pneumatic ankle tourniquet was inflated to 250 mmHg. Attention was directed towards the right first metatarsophalangeal  joint. I created an approximately 3-cm incision overlying the first  metatarsal head. Dissection was carried deep through the subcutaneous  tissues with care being taken to retract all vital neurovascular  structures. I identified the K-wire fixation in the first metatarsal.   I removed this utilizing a plier along with a bur to free up the bone  around the K-wire. The entire wire was removed without difficulty. Wound was flushed with saline. I then deepened my incision overlying the first metatarsal.  The  overlying medial eminence of bone was resected utilizing rongeur and a  power rasp. I then proceeded to close the incision with a combination  of 3-0 Monocryl. I then directed my attention towards the right hallux. I utilized  fluoroscopy to identify the proximal phalanx. I created a small stab  incision along the distal medial aspect of the proximal phalanx. I  utilized the Arthrex 2 x 13 mm bur to create an oblique osteotomy of the  proximal phalanx of the hallux with wedge of bone being removed from the  medial aspect.   I closed the osteotomy with manual pressure and

## 2024-07-11 ENCOUNTER — TELEPHONE (OUTPATIENT)
Dept: BARIATRICS/WEIGHT MGMT | Age: 64
End: 2024-07-11

## 2024-07-11 NOTE — TELEPHONE ENCOUNTER
Patient had a sleeve 9/9/20.  She seen her pcp and was diagnosed with pneumonia.  They want her to take prednisone for 5 days - is that ok?

## 2024-10-03 ENCOUNTER — OFFICE VISIT (OUTPATIENT)
Dept: BARIATRICS/WEIGHT MGMT | Age: 64
End: 2024-10-03
Payer: COMMERCIAL

## 2024-10-03 VITALS
WEIGHT: 210 LBS | HEART RATE: 51 BPM | OXYGEN SATURATION: 98 % | SYSTOLIC BLOOD PRESSURE: 110 MMHG | HEIGHT: 66 IN | DIASTOLIC BLOOD PRESSURE: 74 MMHG | BODY MASS INDEX: 33.75 KG/M2

## 2024-10-03 DIAGNOSIS — Q89.3 SITUS INVERSUS TOTALIS: ICD-10-CM

## 2024-10-03 DIAGNOSIS — Q89.3 KARTAGENER'S SYNDROME: ICD-10-CM

## 2024-10-03 DIAGNOSIS — K30 INDIGESTION: ICD-10-CM

## 2024-10-03 DIAGNOSIS — E66.9 OBESITY (BMI 30-39.9): ICD-10-CM

## 2024-10-03 DIAGNOSIS — Z98.84 STATUS POST LAPAROSCOPIC SLEEVE GASTRECTOMY: ICD-10-CM

## 2024-10-03 DIAGNOSIS — Z86.39 HISTORY OF ELEVATED LIPIDS: ICD-10-CM

## 2024-10-03 DIAGNOSIS — M19.90 ARTHRITIS: ICD-10-CM

## 2024-10-03 DIAGNOSIS — R73.03 PREDIABETES: Primary | ICD-10-CM

## 2024-10-03 PROCEDURE — 99213 OFFICE O/P EST LOW 20 MIN: CPT | Performed by: NURSE PRACTITIONER

## 2024-10-03 RX ORDER — FAMOTIDINE 20 MG/1
20 TABLET, FILM COATED ORAL NIGHTLY
Qty: 30 TABLET | Refills: 3 | Status: SHIPPED | OUTPATIENT
Start: 2024-10-03

## 2025-08-28 ENCOUNTER — TELEPHONE (OUTPATIENT)
Dept: BARIATRICS/WEIGHT MGMT | Age: 65
End: 2025-08-28

## (undated) DEVICE — BLADELESS OBTURATOR: Brand: WECK VISTA

## (undated) DEVICE — DRAPE,REIN 53X77,STERILE: Brand: MEDLINE

## (undated) DEVICE — GLOVE SURG SZ 65 CRM LTX FREE POLYISOPRENE POLYMER BEAD ANTI

## (undated) DEVICE — SUTURE VCRL SZ 3-0 L27IN ABSRB UD L19MM PS-2 3/8 CIR PRIM J427H

## (undated) DEVICE — STAPLER 60: Brand: SUREFORM

## (undated) DEVICE — STAPLER 60 RELOAD BLUE: Brand: SUREFORM

## (undated) DEVICE — COVER LT HNDL BLU PLAS

## (undated) DEVICE — SUTURE VIC + SH-13-0 27IN  VCP311H

## (undated) DEVICE — GAUZE,SPONGE,FLUFF,6"X6.75",STRL,5/TRAY: Brand: MEDLINE

## (undated) DEVICE — BINDER ABD XL W15IN 62 74IN CIRC UNISX 5 PNL E CNTCT CLSR

## (undated) DEVICE — STAPLER 60 RELOAD GREEN: Brand: SUREFORM

## (undated) DEVICE — INSUFFLATION TUBING SET WITH FILTER, FUNNEL CONNECTOR AND LUER LOCK: Brand: JOSNOE MEDICAL INC

## (undated) DEVICE — TIP COVER ACCESSORY

## (undated) DEVICE — BANDAGE COBAN 4 IN COMPR W4INXL5YD FOAM COHESIVE QUIK STK SELF ADH SFT

## (undated) DEVICE — SUTURE ABSRB BRAID COAT VLT SH 3-0 27IN VCRL J311H

## (undated) DEVICE — APPLICATOR MEDICATED 26 CC SOLUTION HI LT ORNG CHLORAPREP

## (undated) DEVICE — AR-200 IRRIGATION TUBING SET

## (undated) DEVICE — SOLUTION ANTIFOG VIS SYS CLEARIFY LAPSCP

## (undated) DEVICE — SKIN PREP TRAY W/CHG: Brand: MEDLINE INDUSTRIES, INC.

## (undated) DEVICE — TROCAR: Brand: KII FIOS FIRST ENTRY

## (undated) DEVICE — DRAIN SURG 19FR 100% SIL RADPQ RND CHN FULL FLUT

## (undated) DEVICE — NEEDLE HYPO 25GA L1.5IN BLU POLYPR HUB S STL REG BVL STR

## (undated) DEVICE — RESERVOIR,SUCTION,100CC,SILICONE: Brand: MEDLINE

## (undated) DEVICE — REDUCER: Brand: ENDOWRIST

## (undated) DEVICE — SOLUTION IV IRRIG 500ML 0.9% SODIUM CHL 2F7123

## (undated) DEVICE — SMALL TEAR CROSS CUT RASP (11.0 X 5.0MM)

## (undated) DEVICE — GUIDEWIRE ORTH DIA0.034IN W/ TRCR TIP LSR MRK DISP FOR MIC

## (undated) DEVICE — PRECISION THIN (9.0 X 0.38 X 25.0MM)

## (undated) DEVICE — GOWN,AURORA,NONRNF,XL,30/CS: Brand: MEDLINE

## (undated) DEVICE — VESSEL SEALER EXTEND: Brand: ENDOWRIST

## (undated) DEVICE — CANNULA SEAL

## (undated) DEVICE — BURR STRAIGHT 13MM DIAM 2.0 MM

## (undated) DEVICE — ADHESIVE SKIN CLSR 0.7ML TOP DERMBND ADV

## (undated) DEVICE — SUTURE VCRL SZ 4-0 L27IN ABSRB UD L19MM PS-2 3/8 CIR PRIM J426H

## (undated) DEVICE — VISIGI 3D®  CALIBRATION SYSTEM  SIZE 36FR STD W/ BULB: Brand: BOEHRINGER® VISIGI 3D™ SLEEVE GASTRECTOMY CALIBRATION SYSTEM, SIZE 36FR W/BULB

## (undated) DEVICE — SUTURE VCRL SZ 0 L27IN ABSRB UD L36MM CT-1 1/2 CIR J260H

## (undated) DEVICE — SEAL

## (undated) DEVICE — SUTURE NONABSORBABLE MONOFILAMENT 3-0 PS-1 18 IN BLK ETHILON 1663H

## (undated) DEVICE — MASTISOL ADHESIVE LIQ 2/3ML

## (undated) DEVICE — Device

## (undated) DEVICE — ARM DRAPE

## (undated) DEVICE — FORCEPS BX L240CM WRK CHN 2.8MM STD CAP W/ NDL MIC MESH

## (undated) DEVICE — GLOVE ORANGE PI 8 1/2   MSG9085

## (undated) DEVICE — STRIP SKIN CLSR W0.25XL4IN WHT SPUNBOUND FBR NYL HI ADH

## (undated) DEVICE — BLADE,CARBON-STEEL,15,STRL,DISPOSABLE,TB: Brand: MEDLINE

## (undated) DEVICE — GLOVE ORANGE PI 8   MSG9080

## (undated) DEVICE — DRESSING TRNSPAR W5XL4.5IN FLM SHT SEMIPERMEABLE WIND

## (undated) DEVICE — BIT DRL DIA2MM STR CANN FOR 2.5MM MIC COMPR FULL THRD SCR